# Patient Record
Sex: FEMALE | Race: WHITE | NOT HISPANIC OR LATINO | Employment: FULL TIME | URBAN - METROPOLITAN AREA
[De-identification: names, ages, dates, MRNs, and addresses within clinical notes are randomized per-mention and may not be internally consistent; named-entity substitution may affect disease eponyms.]

---

## 2019-11-04 ENCOUNTER — OFFICE VISIT - RIVER FALLS (OUTPATIENT)
Dept: FAMILY MEDICINE | Facility: CLINIC | Age: 24
End: 2019-11-04

## 2019-12-27 ENCOUNTER — OFFICE VISIT - RIVER FALLS (OUTPATIENT)
Dept: FAMILY MEDICINE | Facility: CLINIC | Age: 24
End: 2019-12-27

## 2019-12-27 ASSESSMENT — MIFFLIN-ST. JEOR: SCORE: 1465.46

## 2020-07-07 ENCOUNTER — OFFICE VISIT - RIVER FALLS (OUTPATIENT)
Dept: FAMILY MEDICINE | Facility: CLINIC | Age: 25
End: 2020-07-07

## 2020-12-08 ENCOUNTER — AMBULATORY - RIVER FALLS (OUTPATIENT)
Dept: FAMILY MEDICINE | Facility: CLINIC | Age: 25
End: 2020-12-08

## 2020-12-16 ENCOUNTER — OFFICE VISIT - RIVER FALLS (OUTPATIENT)
Dept: FAMILY MEDICINE | Facility: CLINIC | Age: 25
End: 2020-12-16

## 2020-12-16 ASSESSMENT — MIFFLIN-ST. JEOR: SCORE: 1446.53

## 2020-12-17 ENCOUNTER — AMBULATORY - RIVER FALLS (OUTPATIENT)
Dept: FAMILY MEDICINE | Facility: CLINIC | Age: 25
End: 2020-12-17

## 2020-12-18 LAB — C REACTIVE PROTEIN LHE: 22.1 MG/L

## 2020-12-21 ENCOUNTER — OFFICE VISIT - RIVER FALLS (OUTPATIENT)
Dept: FAMILY MEDICINE | Facility: CLINIC | Age: 25
End: 2020-12-21

## 2020-12-21 ASSESSMENT — MIFFLIN-ST. JEOR: SCORE: 1466.94

## 2021-01-06 ENCOUNTER — OFFICE VISIT - RIVER FALLS (OUTPATIENT)
Dept: FAMILY MEDICINE | Facility: CLINIC | Age: 26
End: 2021-01-06

## 2021-06-16 ENCOUNTER — OFFICE VISIT - RIVER FALLS (OUTPATIENT)
Dept: FAMILY MEDICINE | Facility: CLINIC | Age: 26
End: 2021-06-16

## 2021-07-05 ENCOUNTER — OFFICE VISIT - RIVER FALLS (OUTPATIENT)
Dept: FAMILY MEDICINE | Facility: CLINIC | Age: 26
End: 2021-07-05

## 2021-07-13 ENCOUNTER — OFFICE VISIT - RIVER FALLS (OUTPATIENT)
Dept: FAMILY MEDICINE | Facility: CLINIC | Age: 26
End: 2021-07-13

## 2021-07-13 ASSESSMENT — MIFFLIN-ST. JEOR: SCORE: 1465.58

## 2021-07-15 ENCOUNTER — COMMUNICATION - RIVER FALLS (OUTPATIENT)
Dept: FAMILY MEDICINE | Facility: CLINIC | Age: 26
End: 2021-07-15

## 2021-07-15 LAB
ALBUMIN UR-MCNC: NEGATIVE G/DL
APPEARANCE UR: CLEAR
BACTERIA SPEC CULT: NORMAL
BILIRUB UR QL STRIP: NEGATIVE
CHLAMYDIA TRACHOMATIS RNA, TMA - QUEST: NOT DETECTED
COLOR UR AUTO: NORMAL
GLUCOSE UR STRIP-MCNC: NEGATIVE MG/DL
HGB UR QL STRIP: NEGATIVE
KETONES UR STRIP-MCNC: NEGATIVE MG/DL
LEUKOCYTE ESTERASE UR QL STRIP: NEGATIVE
NEISSERIA GONORRHOEAE RNA TMA: NOT DETECTED
NITRATE UR QL: NEGATIVE
PH UR STRIP: 5.5 [PH]
SP GR UR STRIP: 1.01
TRICHOMONAS VAGINALIS (HISTORICAL): NOT DETECTED
UROBILINOGEN UR STRIP-MCNC: NORMAL MG/DL

## 2021-10-07 ENCOUNTER — OFFICE VISIT - RIVER FALLS (OUTPATIENT)
Dept: FAMILY MEDICINE | Facility: CLINIC | Age: 26
End: 2021-10-07

## 2021-11-12 ENCOUNTER — OFFICE VISIT - RIVER FALLS (OUTPATIENT)
Dept: FAMILY MEDICINE | Facility: CLINIC | Age: 26
End: 2021-11-12

## 2021-11-12 ASSESSMENT — MIFFLIN-ST. JEOR: SCORE: 1453.33

## 2021-11-18 ENCOUNTER — OFFICE VISIT - RIVER FALLS (OUTPATIENT)
Dept: FAMILY MEDICINE | Facility: CLINIC | Age: 26
End: 2021-11-18

## 2021-11-18 ASSESSMENT — MIFFLIN-ST. JEOR: SCORE: 1453.33

## 2021-11-24 ENCOUNTER — COMMUNICATION - RIVER FALLS (OUTPATIENT)
Dept: FAMILY MEDICINE | Facility: CLINIC | Age: 26
End: 2021-11-24

## 2021-11-24 ENCOUNTER — TRANSFERRED RECORDS (OUTPATIENT)
Dept: HEALTH INFORMATION MANAGEMENT | Facility: CLINIC | Age: 26
End: 2021-11-24

## 2021-11-24 LAB
C TRACH DNA SPEC QL PROBE+SIG AMP: NEGATIVE
N GONORRHOEA DNA SPEC QL PROBE+SIG AMP: NEGATIVE
SPECIMEN DESCRIP: NORMAL
SPECIMEN DESCRIPTION: NORMAL

## 2021-12-01 ENCOUNTER — OFFICE VISIT - RIVER FALLS (OUTPATIENT)
Dept: FAMILY MEDICINE | Facility: CLINIC | Age: 26
End: 2021-12-01

## 2021-12-01 ASSESSMENT — MIFFLIN-ST. JEOR: SCORE: 1453.33

## 2022-01-08 LAB
HCT VFR BLD AUTO: 34.7 %
HGB BLD-MCNC: 11.3 G/DL
PLATELET # BLD AUTO: 214 X10^3/UL
WBC # BLD AUTO: 3.54 X10^3/UL

## 2022-01-18 ENCOUNTER — COMMUNICATION - RIVER FALLS (OUTPATIENT)
Dept: FAMILY MEDICINE | Facility: CLINIC | Age: 27
End: 2022-01-18

## 2022-01-24 ENCOUNTER — OFFICE VISIT - RIVER FALLS (OUTPATIENT)
Dept: FAMILY MEDICINE | Facility: CLINIC | Age: 27
End: 2022-01-24

## 2022-02-11 VITALS
HEART RATE: 98 BPM | BODY MASS INDEX: 22.58 KG/M2 | TEMPERATURE: 97.2 F | HEART RATE: 72 BPM | HEIGHT: 68 IN | WEIGHT: 149 LBS | WEIGHT: 149 LBS | SYSTOLIC BLOOD PRESSURE: 109 MMHG | BODY MASS INDEX: 22.58 KG/M2 | TEMPERATURE: 97.7 F | DIASTOLIC BLOOD PRESSURE: 72 MMHG | DIASTOLIC BLOOD PRESSURE: 64 MMHG | BODY MASS INDEX: 22.58 KG/M2 | HEIGHT: 68 IN | SYSTOLIC BLOOD PRESSURE: 120 MMHG | OXYGEN SATURATION: 99 % | SYSTOLIC BLOOD PRESSURE: 116 MMHG | DIASTOLIC BLOOD PRESSURE: 76 MMHG | HEART RATE: 79 BPM | HEIGHT: 68 IN | WEIGHT: 149 LBS | TEMPERATURE: 98.3 F

## 2022-02-11 VITALS
BODY MASS INDEX: 22.33 KG/M2 | DIASTOLIC BLOOD PRESSURE: 68 MMHG | TEMPERATURE: 98 F | HEIGHT: 69 IN | HEART RATE: 76 BPM | SYSTOLIC BLOOD PRESSURE: 106 MMHG | WEIGHT: 150.8 LBS

## 2022-02-11 VITALS
SYSTOLIC BLOOD PRESSURE: 110 MMHG | HEART RATE: 74 BPM | DIASTOLIC BLOOD PRESSURE: 76 MMHG | SYSTOLIC BLOOD PRESSURE: 116 MMHG | WEIGHT: 152 LBS | SYSTOLIC BLOOD PRESSURE: 114 MMHG | HEART RATE: 88 BPM | TEMPERATURE: 97.4 F | DIASTOLIC BLOOD PRESSURE: 72 MMHG | HEIGHT: 68 IN | HEIGHT: 68 IN | HEART RATE: 72 BPM | WEIGHT: 147.5 LBS | DIASTOLIC BLOOD PRESSURE: 62 MMHG | BODY MASS INDEX: 22.35 KG/M2 | BODY MASS INDEX: 23.04 KG/M2

## 2022-02-11 VITALS
HEIGHT: 68 IN | TEMPERATURE: 97.8 F | DIASTOLIC BLOOD PRESSURE: 64 MMHG | SYSTOLIC BLOOD PRESSURE: 116 MMHG | OXYGEN SATURATION: 99 % | BODY MASS INDEX: 22.99 KG/M2 | HEART RATE: 77 BPM | WEIGHT: 151.7 LBS

## 2022-02-11 VITALS — HEIGHT: 68 IN

## 2022-02-11 VITALS
DIASTOLIC BLOOD PRESSURE: 64 MMHG | WEIGHT: 155.2 LBS | TEMPERATURE: 98.9 F | OXYGEN SATURATION: 98 % | SYSTOLIC BLOOD PRESSURE: 108 MMHG | BODY MASS INDEX: 23.25 KG/M2 | HEART RATE: 86 BPM

## 2022-02-16 NOTE — TELEPHONE ENCOUNTER
Entered by Leticia Mann CMA on January 12, 2021 7:51:50 AM CST  ---------------------  From: Leticia Mann CMA   To: [facility] #[id number]    Sent: 1/12/2021 7:51:50 AM CST  Subject: Medication Management     ** Not Approved: Refill not appropriate **  ethinyl estradiol-etonogestrel (ETONOGESTERE ETHYNYL EST VAG RING)  INSERT 1 RING VAGINALLY EVERY 4 WEEKS  Qty:  1 ring        Days Supply:  28        Refills:  0          Substitutions Allowed     Route To Pharmacy - [facility] #[id number]   Signed by Leticia Mann CMA            ------------------------------------------  From: [facility] #[id number]  To: Edmar Bermudez MD  Sent: January 12, 2021 3:58:44 AM CST  Subject: Medication Management  Due: January 12, 2021 10:11:26 AM CST     ** On Hold Pending Signature **     Dispensed Drug: ethinyl estradiol-etonogestrel (ethinyl estradiol-etonogestrel 0.015 mg-0.120 mg/24 hours vaginal ring), INSERT 1 RING VAGINALLY EVERY 4 WEEKS  Quantity: 1 ring  Days Supply: 28  Refills: 0  Substitutions Allowed  Notes from Pharmacy:  ------------------------------------------IUD placed 1/6/21

## 2022-02-16 NOTE — NURSING NOTE
CAGE Assessment Entered On:  12/28/2020 3:13 PM CST    Performed On:  12/16/2020 3:13 PM CST by Arlene Alexis               Assessment   Have you ever felt you should cut down on your drinking :   No   Have people annoyed you by criticizing your drinking :   No   Have you ever felt bad or guilty about your drinking :   No   Have you ever taken a drink first thing in the morning to steady your nerves or get rid of a hangover (Eye-opener) :   No   CAGE Score :   0    Arlene Alexis - 12/28/2020 3:13 PM CST

## 2022-02-16 NOTE — PROGRESS NOTES
Patient:   MADHU SPANGLER            MRN: [id number]            FIN: [id number]               Age:   25 years     Sex:  Female     :  1995   Associated Diagnoses:   Well adult exam   Author:   Edmar Bermudez MD      Visit Information      Date of Service: 2020 03:58 pm  Performing Location: [facility]  Encounter#: [id number]      Primary Care Provider (PCP):  Zac Correa MD    NPI# [id number]   Visit type:  Annual exam.    Accompanied by:  No one.    Source of history:  Self.       Chief Complaint   2020 4:11 PM CST   Px        Well Adult History   Patient is here for physical exam.  She currently lives in the [facility] with her sister.  She does have a partner of 3 years.  She had STD screening during this time.  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  She does not smoke limited alcohol use.  She is looking to change from her current NuvaRing which she does not like as she feels down 1 week of the month.  She sees [facility] for her Crohn s disease.  She has had a prior hemicolectomy.         Review of Systems   Constitutional:  No weakness, No fatigue, No decreased activity, No weight gain.    Eye:  No recent visual problem, No blurring, No double vision.    Ear/Nose/Mouth/Throat:  Negative.    Respiratory:  No shortness of breath, No cough.    Cardiovascular:  No chest pain, No peripheral edema.    Gastrointestinal:  No nausea, No vomiting, No diarrhea, No constipation, No abdominal pain.    Genitourinary:  No dysuria, No hematuria, No change in urine stream.    Gynecologic:  Negative.    Hematology/Lymphatics:  Negative.    Endocrine:  Negative.    Immunologic:  Negative.    Musculoskeletal:  Negative.    Integumentary:  No rash.    Neurologic:  Alert and oriented X4.    Psychiatric:  Negative.       Health Status   Allergies:    Nonallergic Reactions (Selected)  Severity Not  Documented  Asacol (Diarrhea)   Problem list:    All Problems  Crohn disease / SNOMED CT 59902324 / Confirmed  Duodenitis / ICD-9-.60 / Confirmed  Resolved: Otitis Media / ICD-9-.9  Canceled: Crohns Disease / ICD-9-.9   Medications:  (Selected)   Prescriptions  Prescribed  NuvaRing 0.120 mg-0.015 mg/24 hours vaginal rin EA, Vaginal, q4 wks, Instructions: 3 month supply sent. Need annual px for further refills., # 1 EA, 3 Refill(s), Type: Maintenance, Pharmacy: [facility] #[id number], 1 EA Vaginal q4 wks,Instr:3 month supply sent. Need annual px for further ref...  Documented Medications  Documented  Humira 40 mg/0.8 mL subcutaneous kit: 0.8 mL ( 40 mg ), subcutaneous, once, Instructions: per Dr. Adry Vaca, 0 Refill(s), Type: Maintenance      Histories   Past Medical History:    Active  Crohn disease (12868168)  Resolved  Otitis Media (382.9): Onset in  at 13 years.  Resolved.   Family History:    Heart  Sister  Hypothyroid  Mother  Diabetes mellitus  Sister  Kidney Disease  Father     Procedure history:    Esophagogastroduodenoscopy (SNOMED CT 801677416) performed by Ronald Ga MD on 2011 at 16 Years.  Colonoscopy (SNOMED CT 848158346) on 2011 at 16 Years.  None (SNOMED CT 869339489).  History of hemicolectomy (SNOMED CT 1717360557).   Social History:        Electronic Cigarette/Vaping Assessment            Electronic Cigarette Use: Never.      Alcohol Assessment: Current            Current, 1-2 times per week      Tobacco Assessment            Never (less than 100 in lifetime)      Substance Abuse Assessment: Denies Substance Abuse      Employment and Education Assessment            Student                     Comments:                      2011 - [employer]      Exercise and Physical Activity Assessment: Does not exercise      Sexual Assessment: No Sexual Activity  ,        Electronic Cigarette/Vaping Assessment             Electronic Cigarette Use: Never.      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 FONTANEZ, [employer]      Exercise and Physical Activity Assessment: Does not exercise      Sexual Assessment: No Sexual Activity        Physical Examination   Vital Signs   12/16/2020 4:11 PM CST Temperature Tympanic 97.4 DegF  LOW    Peripheral Pulse Rate 88 bpm    HR Method Electronic    Systolic Blood Pressure 114 mmHg    Diastolic Blood Pressure 76 mmHg    Mean Arterial Pressure 89 mmHg    BP Method Electronic      Measurements from flowsheet : Measurements   12/16/2020 4:11 PM CST Height Measured - Standard 68.25 in    Weight Measured - Standard 147.5 lb    BSA 1.79 m2    Body Mass Index 22.26 kg/m2      General:  Alert and oriented, No acute distress.    Eye:  Pupils are equal, round and reactive to light, Extraocular movements are intact, Normal conjunctiva, Vision unchanged.    HENT:  Normocephalic, Tympanic membranes are clear.    Neck:  Supple, Non-tender, No carotid bruit, No jugular venous distention, No lymphadenopathy, No thyromegaly.    Respiratory:  Lungs are clear to auscultation, Respirations are non-labored, Breath sounds are equal, Symmetrical chest wall expansion.    Cardiovascular:  Normal rate, Regular rhythm, No murmur, Good pulses equal in all extremities, Normal peripheral perfusion, No edema.    Gastrointestinal:  Soft, Non-tender, Non-distended, Normal bowel sounds, No organomegaly.    Musculoskeletal:  Normal range of motion, Normal strength, No tenderness, No deformity, Normal gait.    Integumentary:  Warm, Dry, No rash.    Neurologic:  Alert, Oriented, No focal deficits, Cranial Nerves II-XII are grossly  intact.    Psychiatric:  Cooperative, Appropriate mood & affect.       Impression and Plan   Diagnosis     Well adult exam (JXV43-BE Z00.00).     Course:  Progressing as expected.    Plan:  fu 1 yr, She will set up a time here with nurse practitioner for Pap smear and then information given on both the Nexplanon and IUD.  .    Patient Instructions:       Counseled: Patient, Regarding diagnosis, Regarding medications.

## 2022-02-16 NOTE — NURSING NOTE
Comprehensive Intake Entered On:  6/16/2021 7:45 AM CDT    Performed On:  6/16/2021 7:41 AM CDT by Fletcher Stovall LPN               Summary   Chief Complaint :   VIDEO VISIT, NO VITAL SIGNS TAKEN - UTI CONCERNS - Symptoms started yesterday, Increased frequency, burning, uncomfortable, drinking water but not improving, hx of UTI   Menstrual Status :   Menarcheal   Fletcher Stovall LPN - 6/16/2021 7:41 AM CDT   Consents   Consent for Immunization Exchange :   Consent Granted   Consent for Immunizations to Providers :   Consent Granted   Fletcher Stovall LPN - 6/16/2021 7:41 AM CDT   Meds / Allergies   (As Of: 6/16/2021 7:45:27 AM CDT)   Allergies (Active)   Asacol  Estimated Onset Date:   Unspecified ; Reactions:   Diarrhea ; Created By:   Heidi Bowles LPN; Reaction Status:   Active ; Category:   Drug ; Substance:   Asacol ; Type:   Side Effect ; Updated By:   Heidi Bowles LPN; Source:   Patient ; Reviewed Date:   7/7/2020 1:08 PM CDT        Medication List   (As Of: 6/16/2021 7:45:27 AM CDT)   Prescription/Discharge Order    levonorgestrel  :   levonorgestrel ; Status:   Prescribed ; Ordered As Mnemonic:   Kyleena 19.5 mg intrauterine device ; Simple Display Line:   19.5 mg, 1 EA, Intrauteral, once, 1 EA, 0 Refill(s) ; Ordering Provider:   Cecil Benton MD; Catalog Code:   levonorgestrel ; Order Dt/Tm:   1/6/2021 10:37:27 AM CST ; Comment:   Placed 1/6/21  Lot [id number]  Exp 2/2022            Home Meds    adalimumab  :   adalimumab ; Status:   Documented ; Ordered As Mnemonic:   Humira 40 mg/0.8 mL subcutaneous kit ; Simple Display Line:   40 mg, 0.8 mL, subcutaneous, once, per Dr. Adry Vaca ; Catalog Code:   adalimumab ; Order Dt/Tm:   12/21/2012 4:10:05 PM CST            ID Risk Screen   Recent Travel History :   No recent travel   Family Member Travel History :   No recent travel   Other Exposure to Infectious Disease :   Unknown   COVID-19 Testing Status :   No positive COVID-19 test   Sia  Fletcher JIANG - 6/16/2021 7:41 AM CDT   Social History   Social History   (As Of: 6/16/2021 7:45:27 AM CDT)   Alcohol:        Current, Beer (12 oz), Wine (5 oz), 1-2 times per week, 1 drinks/episode average.  2 drinks/episode maximum.  Ready to change: No.   (Last Updated: 12/28/2020 3:10:49 PM CST by Arlene Alexis)          Tobacco:        Never (less than 100 in lifetime)   (Last Updated: 12/16/2020 4:11:32 PM CST by Lexi Estrada)          Electronic Cigarette/Vaping:        Electronic Cigarette Use: Never.   (Last Updated: 12/16/2020 4:11:35 PM CST by Lexi Estrada)          Substance Abuse:        Current, Marijuana, 1-2 times per year, Ready to change: No.   (Last Updated: 12/28/2020 3:11:06 PM CST by Arlene Alexis)          Home/Environment:        Marital status: Single.  Living situation: Home/Independent.  Injuries/Abuse/Neglect in household: No.  Feels unsafe at home: No.  Family/Friends available for support: Yes.   (Last Updated: 12/28/2020 3:11:33 PM CST by Arlene Alexis)          Nutrition/Health:        Type of diet: Regular.  Wants to lose weight: No.  Sleeping concerns: No.  Feels highly stressed: No.   (Last Updated: 12/28/2020 3:11:44 PM CST by Arlene Alexis)          Exercise:        Exercise frequency: 1-2 times/week.  Exercise type: Yoga.   (Last Updated: 12/28/2020 3:11:59 PM CST by Arlene Alexis)          Sexual:        Sexually active: Yes.  Identifies as female, Sexual orientation: Straight or heterosexual.  History of STD: No.  Contraceptive Use Details: Vaginal ring.  History of sexual abuse: No.   (Last Updated: 12/28/2020 3:12:17 PM CST by Arlene Alexis)

## 2022-02-16 NOTE — PROGRESS NOTES
Patient:   MADHU SPANGLER            MRN: [id number]            FIN: [id number]               Age:   25 years     Sex:  Female     :  1995   Associated Diagnoses:   Acute cystitis   Author:   Parviz Yeh MD      Visit Information      Date of Service: 2020 10:20 am  Performing Location: [facility]  Encounter#: [id number]      Primary Care Provider (PCP):  Zac Correa MD    NPI# [id number]      Referring Provider:  No referring provider recorded for selected visit.      Chief Complaint   2020 10:13 AM CDT    Verbal consent given for video visit via cell phone. Concerns with polyuria and dysuria x 3 days. Has UTI hx.        Subjective   Chief complaint 2020 10:13 AM CDT    Verbal consent given for video visit via cell phone. Concerns with polyuria and dysuria x 3 days. Has UTI hx.  .     see chief complaint as noted above and confirmed with the patient  no nausea, no emesis, no fever or chills, no flank pain  video visit with patient at home      Health Status   Allergies:    Nonallergic Reactions (Selected)  Severity Not Documented  Asacol (Diarrhea)   Medications:  (Selected)   Prescriptions  Prescribed  NuvaRing 0.120 mg-0.015 mg/24 hours vaginal rin EA, Vaginal, q4 wks, Instructions: 3 month supply sent. Need annual px for further refills., # 1 EA, 3 Refill(s), Type: Maintenance, Pharmacy: [facility] #[id number], 1 EA Vaginal q4 wks,Instr:3 month supply sent. Need annual px for further ref...  nitrofurantoin macrocrystals 50 mg oral capsule: = 1 cap(s) ( 50 mg ), Oral, qid, x 3 day(s), # 12 cap(s), 0 Refill(s), Type: Acute, Pharmacy: [facility] #[id number], 1 cap(s) Oral qid,x3 day(s), 68.5, in, 19 15:33:00 CST, Height Measured, 150.8, lb, 19 15:33:00 CST, Weight Measured...  Documented Medications  Documented  Humira 40 mg/0.8 mL subcutaneous kit: 0.8 mL ( 40 mg ), subcutaneous, once, Instructions: per Dr. Adry Vaca, 0 Refill(s),  Type: Maintenance,    Medications          *denotes recorded medication          *Humira 40 mg/0.8 mL subcutaneous kit: 40 mg, 0.8 mL, subcutaneous, once, per Dr. Adry Vaca.          NuvaRing 0.120 mg-0.015 mg/24 hours vaginal rin EA, Vaginal, q4 wks, 3 month supply sent. Need annual px for further refills., 1 EA, 3 Refill(s).          nitrofurantoin macrocrystals 50 mg oral capsule: 50 mg, 1 cap(s), Oral, qid, for 3 day(s), 12 cap(s), 0 Refill(s).       Problem list:    All Problems (Selected)  Duodenitis / 535.60 / Confirmed  Crohn disease / 91646396 / Confirmed      Objective   General:  Alert and oriented, No acute distress.    Psychiatric:  Cooperative, Appropriate mood & affect, Normal judgment.       Impression and Plan   Assessment and Plan:          Diagnosis: Acute cystitis (INZ32-TZ N30.00).         Course: discussed UTI and what to expect and when to return especially fevers, flank pain and nausea vomiting  reviewed fluid intake  discussed antibiotics and risks including yeast infections.    Orders      (Selected)   Prescriptions  Prescribed  nitrofurantoin macrocrystals 50 mg oral capsule: = 1 cap(s) ( 50 mg ), Oral, qid, x 3 day(s), # 12 cap(s), 0 Refill(s), Type: Acute, Pharmacy: [facility] #[id number], 1 cap(s) Oral qid,x3 day(s), 68.5, in, 19 15:33:00 CST, Height Measured, 150.8, lb, 19 15:33:00 CST, Weight Measured....     .

## 2022-02-16 NOTE — NURSING NOTE
Comprehensive Intake Entered On:  7/5/2021 11:26 AM CDT    Performed On:  7/5/2021 11:23 AM CDT by Elda Padron LPN               Summary   Chief Complaint :   fells that UTI is back, Urine frequency started again this morning.  wondering if could get a refill of antibioitcs or change them. Verbal consent for Video visit.    Menstrual Status :   Menarcheal   Elda Padron LPN - 7/5/2021 11:23 AM CDT   Health Status   Allergies Verified? :   Yes   Medication History Verified? :   Yes   Pre-Visit Planning Status :   Completed   Elda Padron LPN - 7/5/2021 11:23 AM CDT   Consents   Consent for Immunization Exchange :   Consent Granted   Consent for Immunizations to Providers :   Consent Granted   Elda Padron LPN - 7/5/2021 11:23 AM CDT   Meds / Allergies   (As Of: 7/5/2021 11:26:20 AM CDT)   Allergies (Active)   Asacol  Estimated Onset Date:   Unspecified ; Reactions:   Diarrhea ; Created By:   Heidi Bowles LPN; Reaction Status:   Active ; Category:   Drug ; Substance:   Asacol ; Type:   Side Effect ; Updated By:   Heidi Bowles LPN; Source:   Patient ; Reviewed Date:   7/5/2021 11:25 AM CDT        Medication List   (As Of: 7/5/2021 11:26:20 AM CDT)   Prescription/Discharge Order    levonorgestrel  :   levonorgestrel ; Status:   Prescribed ; Ordered As Mnemonic:   Kyleena 19.5 mg intrauterine device ; Simple Display Line:   19.5 mg, 1 EA, Intrauteral, once, 1 EA, 0 Refill(s) ; Ordering Provider:   Cecil Benton MD; Catalog Code:   levonorgestrel ; Order Dt/Tm:   1/6/2021 10:37:27 AM CST ; Comment:   Placed 1/6/21  Lot [id number]  Exp 2/2022          sulfamethoxazole-trimethoprim  :   sulfamethoxazole-trimethoprim ; Status:   Processing ; Ordered As Mnemonic:   sulfamethoxazole-trimethoprim 800 mg-160 mg oral tablet ; Ordering Provider:   Parviz Yeh MD; Action Display:   Complete ; Catalog Code:   sulfamethoxazole-trimethoprim ; Order Dt/Tm:   7/5/2021 11:23:59 AM CDT             Home Meds    adalimumab  :   adalimumab ; Status:   Documented ; Ordered As Mnemonic:   Humira 40 mg/0.8 mL subcutaneous kit ; Simple Display Line:   40 mg, 0.8 mL, subcutaneous, once, per Dr. Adry Vaca ; Catalog Code:   adalimumab ; Order Dt/Tm:   12/21/2012 4:10:05 PM CST

## 2022-02-16 NOTE — NURSING NOTE
Comprehensive Intake Entered On:  12/27/2019 3:38 PM CST    Performed On:  12/27/2019 3:33 PM CST by Maria Teresa Pool CMA               Summary   Chief Complaint :   c/o left ear pain/jaw for the past month, dizziness for the past week   Menstrual Status :   Menarcheal   Weight Measured :   150.8 lb(Converted to: 150 lb 13 oz, 68.40 kg)    Height Measured :   68.5 in(Converted to: 5 ft 8 in, 173.99 cm)    Body Mass Index :   22.59 kg/m2   Body Surface Area :   1.82 m2   Systolic Blood Pressure :   106 mmHg   Diastolic Blood Pressure :   68 mmHg   Mean Arterial Pressure :   81 mmHg   Peripheral Pulse Rate :   76 bpm   BP Site :   Right arm   Pulse Site :   Radial artery   BP Method :   Manual   HR Method :   Manual   Temperature Tympanic :   98 DegF(Converted to: 36.7 DegC)    Maria Teresa Pool CMA - 12/27/2019 3:33 PM CST   Health Status   Allergies Verified? :   Yes   Medication History Verified? :   Yes   Medical History Verified? :   No   Pre-Visit Planning Status :   Not completed   Tobacco Use? :   Never smoker   Maria Teresa Pool CMA - 12/27/2019 3:33 PM CST   Consents   Consent for Immunization Exchange :   Consent Granted   Consent for Immunizations to Providers :   Consent Granted   Maria Teresa Pool CMA - 12/27/2019 3:33 PM CST   Problems   (As Of: 12/27/2019 3:38:39 PM CST)   Problems(Active)    Crohn disease (SNOMED CT  :62325374 )  Name of Problem:   Crohn disease ; Recorder:   Michelle Barron; Confirmation:   Confirmed ; Classification:   Medical ; Code:   43875418 ; Contributor System:   PowerChart ; Last Updated:   1/22/2016 11:50 AM CST ; Life Cycle Status:   Active ; Vocabulary:   SNOMED CT        Duodenitis (ICD-9-CM  :535.60 )  Name of Problem:   Duodenitis ; Recorder:   Destinee Zapata CMA; Confirmation:   Confirmed ; Classification:   Medical ; Code:   535.60 ; Contributor System:   PowerChart ; Last Updated:   7/22/2012 11:17 AM CDT ; Life Cycle Date:   7/22/2012 ; Life Cycle Status:   Active ; Vocabulary:    ICD-9-CM          Meds / Allergies   (As Of: 12/27/2019 3:38:39 PM CST)   Allergies (Active)   Asacol  Estimated Onset Date:   Unspecified ; Reactions:   Diarrhea ; Created By:   Heidi Bowles LPN; Reaction Status:   Active ; Category:   Drug ; Substance:   Asacol ; Type:   Side Effect ; Updated By:   Heidi Bowles LPN; Source:   Patient ; Reviewed Date:   12/27/2019 3:36 PM CST        Medication List   (As Of: 12/27/2019 3:38:39 PM CST)   Home Meds    adalimumab  :   adalimumab ; Status:   Documented ; Ordered As Mnemonic:   Humira 40 mg/0.8 mL subcutaneous kit ; Simple Display Line:   40 mg, 0.8 mL, subcutaneous, once, per Dr. Adry Vaca ; Catalog Code:   adalimumab ; Order Dt/Tm:   12/21/2012 4:10:05 PM CST          azaTHIOprine  :   azaTHIOprine ; Status:   Documented ; Ordered As Mnemonic:   azaTHIOprine 50 mg oral tablet ; Simple Display Line:   50 mg, 1 tab(s), Oral, daily, 0 Refill(s) ; Catalog Code:   azaTHIOprine ; Order Dt/Tm:   11/4/2019 5:40:50 PM CST          ethinyl estradiol-etonogestrel  :   ethinyl estradiol-etonogestrel ; Status:   Documented ; Ordered As Mnemonic:   NuvaRing ; Simple Display Line:   1 EA, vag, q4 wks, 0 Refill(s) ; Catalog Code:   ethinyl estradiol-etonogestrel ; Order Dt/Tm:   11/24/2015 4:08:39 PM CST

## 2022-02-16 NOTE — PROGRESS NOTES
Chief Complaint    VIDEO VISIT, NO VITAL SIGNS TAKEN - UTI CONCERNS - Symptoms started yesterday, Increased frequency, burning, uncomfortable, drinking water but not improving, hx of UTI  History of Present Illness       Patient requesting treatment for UTI.  24 hours of urinary frequency burning.  She has had UTIs before she also has Crohn's disease.  She is not having fever chills or flank pain no nausea vomiting       Video visit from summer school in [facility] from 7:40 AM to 7:55 AM  patient consented to video visit  Review of Systems       See HPI.  All other review of systems negative.  Physical Exam       Alert talkative no distress  Assessment/Plan       1. UTI symptoms (R39.9)          Discussed signs to watch for when to return we will go ahead and treat with Septra DS twice a day for 5 days  Patient Information     Name:MADHU SPANGLER      Address:      [address] [place] [id number]     Sex:Female     YOB: 1995     [phone]     Emergency Contact:SUSANNE SPANGLER     MRN:[id number]     FIN:[id number]     Location:[facility]     Date of Service:06/16/2021      Primary Care Physician:       Zac Correa MD, [phone]      Attending Physician:       Parviz Yeh MD, [phone]  Problem List/Past Medical History    Ongoing     ASCUS with positive high risk HPV cervical       Comments: referred for colposcopy     Crohn disease     Duodenitis    Historical     Otitis Media  Procedure/Surgical History     Insertion of IUD (01/06/2021)      Comments: Kyleena IUD      Lot [id number]      Exp 2/2022.     Colonoscopy (12/05/2011)     Esophagogastroduodenoscopy (12/05/2011)     History of hemicolectomy     None  Medications    Humira 40 mg/0.8 mL subcutaneous kit, 40 mg= 0.8 mL, Subcutaneous, once    Kyleena 19.5 mg intrauterine device, 19.5 mg= 1 EA, Intrauteral, once    sulfamethoxazole-trimethoprim 800 mg-160 mg oral tablet, 1 tab(s), Oral,  bid  Allergies    Asacol (Diarrhea)  Social History    Smoking Status     Never smoker     Alcohol      Current, Beer (12 oz), Wine (5 oz), 1-2 times per week, 1 drinks/episode average. 2 drinks/episode maximum. Ready to change: No.     Electronic Cigarette/Vaping      Electronic Cigarette Use: Never.     Exercise      Exercise frequency: 1-2 times/week. Exercise type: Yoga.     Home/Environment      Marital status: Single. Living situation: Home/Independent. Injuries/Abuse/Neglect in household: No. Feels unsafe at home: No. Family/Friends available for support: Yes.     Nutrition/Health      Type of diet: Regular. Wants to lose weight: No. Sleeping concerns: No. Feels highly stressed: No.     Sexual      Sexually active: Yes. Identifies as female, Sexual orientation: Straight or heterosexual. History of STD: No. Contraceptive Use Details: Vaginal ring. History of sexual abuse: No.     Substance Abuse      Current, Marijuana, 1-2 times per year, Ready to change: No.     Tobacco      Never (less than 100 in lifetime)  Family History    Alzheimer's disease: Grandmother (P).    Asthma: Mother.    Diabetes mellitus: Sister.    Heart: Sister.    Hypothyroid: Mother.    Kidney Disease: Father.    Mental health disorder: Father.  Immunizations          Scheduled Immunizations          Dose Date(s)          human papillomavirus vaccine          06/12/2012, 08/31/2012, 12/21/2012          influenza virus vaccine, inactivated          10/04/2013, 10/24/2008, 09/28/2009, 10/01/2013, 10/10/2019          meningococcal conjugate vaccine          08/31/2012          tetanus/diphth/pertuss (Tdap) adult/adol          01/01/2011          varicella          09/29/2010          Other Immunizations          Hep B          01/23/1998, 02/24/1998, 08/12/1998          influenza          09/29/2010          MMR (measles/mumps/rubella)          04/18/1994, 08/12/1998          varicella          08/12/1998          OPV          1995,  1993, 04/18/1994, 08/12/1998          influenza virus vaccine, inactivated          10/05/2012, 12/23/2015, 12/16/2020          Hib (PRP-T)          1995, 1993, 1993, 04/18/1994          meningococcal conjugate vaccine          11/12/2005          tetanus/diphth/pertuss (Tdap) adult/adol          11/12/2005, 12/16/2020          DTaP          1995, 1995, 1993, 04/18/1994, 08/12/1998

## 2022-02-16 NOTE — TELEPHONE ENCOUNTER
---------------------  From: Eloise Wyatt RN (Phone Messages Pool ([id number]_[facility]))   To: BRM Message Pool ([id number]_[place] [facility] [place]);     Sent: 10/7/2021 10:00:04 AM CDT  Subject: Monoclonal Antibodies        PCP:   Asked for BRM      Time of Call:  0955       Person Calling:  Pt  Phone number:  [phone]    Note:   Pt calling stating that her dad used to work at [employer] and he told her to call and ask for BRM.  Pt states that she was tested at  [facility] [place], [place] and is positive for COVID.  Pt states she has Crohn's disease and is treated with Humira.  Pt states that she has a low grade fever, chills, dry cough, congestion, chest pressure and some SOB.  Pt does not have a way to check her O2 sats at home.  Pt is requesting monoclonal antibody treatment.  Please advise.    Last office visit and reason:  7/13/21 Possible UTI---------------------  From: Destinee Zapata CMA (BRM Message Pool ([id number]_[facility]))   To: Roddy Johnson MD;     Sent: 10/7/2021 10:21:48 AM CDT  Subject: FW: Monoclonal Antibodies      please advise  pt's sx started Tuesday---------------------  From: Roddy Johnson MD   To: [facility] Message Pool ([id number]_[place]);     Sent: 10/7/2021 11:08:51 AM CDT  Subject: RE: Monoclonal Antibodies      I spoke with Brenda.  Can you get her on our schedule for telephone visit.  I've already talked with her, but I think best to have an official telephone visit if we're pursuing monoclonal antibodies.    I completed [facility] form.  I'm not sure if her residence is going to be an issue (living in [facility], but working in [place]).  I would encourage her to use the [facility] as place of address.monoclonal antibody infusion order faxed to [facility]-confirmation rec'd

## 2022-02-16 NOTE — PROGRESS NOTES
Patient:   MADHU SPANGLER            MRN: [id number]            FIN: [id number]               Age:   25 years     Sex:  Female     :  1995   Associated Diagnoses:   Pap smear for cervical cancer screening; Screen for STD (sexually transmitted disease)   Author:   Nidia Lozada      Chief Complaint   2020 5:35 PM CST   due pap, would like to discuss birth control        History of Present Illness   hasn't had pap for about 5  years, last was normal  monthly periods with nuvaring, she would like to switch to an IUD maybe, looking for more information  has no contraindications to use  Is with steady partner currently, agreeable to ct screen with pap today         Health Status   Allergies:    Nonallergic Reactions (Selected)  Severity Not Documented  Asacol (Diarrhea)   Medications:  (Selected)   Prescriptions  Prescribed  NuvaRing 0.120 mg-0.015 mg/24 hours vaginal rin EA, Vaginal, q4 wks, # 1 EA, 0 Refill(s), Type: Maintenance, Pharmacy: [id number], 1 EA Vaginal q4 wks, 68.25, in, 20 16:11:00 CST, Height Measured, 147.5, lb, 20 16:11:00 CST, Weight Measured  Documented Medications  Documented  Humira 40 mg/0.8 mL subcutaneous kit: 0.8 mL ( 40 mg ), subcutaneous, once, Instructions: per Dr. Adry Vaca, 0 Refill(s), Type: Maintenance   Problem list:    All Problems  Crohn disease / SNOMED CT 62348206 / Confirmed  Duodenitis / ICD-9-.60 / Confirmed  Resolved: Otitis Media / ICD-9-.9  Canceled: Crohns Disease / ICD-9-.9      Histories   Past Medical History:    Active  Crohn disease (48032612)  Resolved  Otitis Media (382.9): Onset in  at 13 years.  Resolved.   Family History:    Heart  Sister  Hypothyroid  Mother  Diabetes mellitus  Sister  Kidney Disease  Father     Procedure history:    Esophagogastroduodenoscopy (SNOMED CT 800413473) performed by Ronald Ga MD on 2011 at 16 Years.  Colonoscopy (SNOMED CT 924046074) on 2011 at  16 Years.  None (SNOMED CT 337440553).  History of hemicolectomy (SNOMED CT 1199467032).   Social History:        Electronic Cigarette/Vaping Assessment            Electronic Cigarette Use: Never.      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    Genitourinary:  No inguinal tenderness, No urethral discharge, No lesions, pelvic exam reveals no inguinal lymph nodes palpable, no external lesions, no odor no discharge. Introitus normal with vault with normal rugae, cervix visualized and clear. No cervical motion tenderness, no adnexal tenderness or masses  .       Impression and Plan   Diagnosis     Pap smear for cervical cancer screening (LOJ50-OE Z12.4).     Screen for STD (sexually transmitted disease) (GGB33-TI Z11.3).     Patient Instructions:       Counseled: Patient, Regarding diagnosis, Regarding treatment, Regarding medications, Verbalized understanding.    Orders     Orders (Selected)   Outpatient Orders  Ordered (Dispatched)  Thinprep tis pap reflex hpv mRNA Chlamydia/N.Gonorrhoeae* ([facility]): Specimen Type: Pap, Collection Date: 12/21/20 17:59:00 CST.     educated on  use/risk/benefit insertion of IUD, she would like to proceed with kyleena or eleazar, looking for something with a little less hormone than currently. She will continue nuvaring till IUD is inserted, she has supply.

## 2022-02-16 NOTE — PROGRESS NOTES
Patient:   MADHU SPANGLER            MRN: [id number]            FIN: [id number]               Age:   26 years     Sex:  Female     :  1995   Associated Diagnoses:   Dysuria; Routine screening for STI (sexually transmitted infection)   Author:   Nidia Lozada      Visit Information      Date of Service: 2021 12:46 pm  Performing Location: [facility]  Encounter#: [id number]      Primary Care Provider (PCP):  Zac Correa MD    NPI# [id number]      Referring Provider:  Nidia Lozada    NPI# [id number]      Chief Complaint   2021 12:51 PM CDT   possible UTI, dx about 1 month ago, has taken 2 rounds of antibiotic and feels like something is still off      History of Present Illness   dysuria and frequency of urine for almost a month. No vaginal symptoms. Has taken two rounds of antibiotics (bactrim twice) but hasn't had UA/UC yet, will do that today. No fever or back pain. Uses Humira for Chrons.  Has a new sexual partner, using Kyleena IUD with no periods, very pleased with IUD. She is using a med for urinary pain and cranberry tabs but over all she feels better      Health Status   Allergies:    Nonallergic Reactions (Selected)  Severity Not Documented  Asacol (Diarrhea)   Medications:  (Selected)   Prescriptions  Prescribed  Kyleena 19.5 mg intrauterine device: = 1 EA ( 19.5 mg ), Intrauteral, once, # 1 EA, 0 Refill(s), Type: Soft Stop, other reason (Rx)  Documented Medications  Documented  Humira 40 mg/0.8 mL subcutaneous kit: 0.8 mL ( 40 mg ), subcutaneous, once, Instructions: per Dr. Adry Vaca, 0 Refill(s), Type: Maintenance,    Medications          *denotes recorded medication          *Humira 40 mg/0.8 mL subcutaneous kit: 40 mg, 0.8 mL, subcutaneous, once, per Dr. Adry Vaca.          Kyleena 19.5 mg intrauterine device: 19.5 mg, 1 EA, Intrauteral, once, 1 EA, 0 Refill(s).       Problem list:    All Problems  ASCUS with positive high risk HPV cervical /  SNOMED CT 940648640 / Confirmed  referred for colposcopy  Crohn disease / SNOMED CT 13712773 / Confirmed  Duodenitis / ICD-9-.60 / Confirmed  Resolved: Otitis Media / ICD-9-.9  Canceled: Crohns Disease / ICD-9-.9      Histories   Past Medical History:    Active  Crohn disease (10988430)  Resolved  Otitis Media (382.9): Onset in 2009 at 13 years.  Resolved.   Family History:    Heart  Sister  Hypothyroid  Mother  Diabetes mellitus  Sister  Asthma  Mother  Mental health disorder  Father  Alzheimer's disease  Grandmother (P)  Kidney Disease  Father     Procedure history:    Insertion of IUD (SNOMED CT 500470098) performed by Cecil Benton MD on 1/6/2021 at 25 Years.  Comments:  1/6/2021 10:39 AM Viola Ybarra CMA IUD  Lot [id number]  Exp 2/2022  Esophagogastroduodenoscopy (SNOMED CT 347064033) performed by Ronald Ga MD on 12/5/2011 at 16 Years.  Colonoscopy (SNOMED CT 515394185) on 12/5/2011 at 16 Years.  None (SNOMED CT 302682907).  History of hemicolectomy (SNOMED CT 3847837305).   Social History:        Electronic Cigarette/Vaping Assessment            Electronic Cigarette Use: Never.      Alcohol Assessment            Current, Beer (12 oz), Wine (5 oz), 1-2 times per week, 1 drinks/episode average.  2 drinks/episode maximum.               Ready to change: No.      Tobacco Assessment            Never (less than 100 in lifetime)      Substance Abuse Assessment            Current, Marijuana, 1-2 times per year, Ready to change: No.      Home and Environment Assessment            Marital status: Single.  Living situation: Home/Independent.  Injuries/Abuse/Neglect in household: No.  Feels               unsafe at home: No.  Family/Friends available for support: Yes.      Nutrition and Health Assessment            Type of diet: Regular.  Wants to lose weight: No.  Sleeping concerns: No.  Feels highly stressed: No.      Exercise and Physical Activity Assessment            Exercise  frequency: 1-2 times/week.  Exercise type: Yoga.      Sexual Assessment            Sexually active: Yes.  Identifies as female, Sexual orientation: Straight or heterosexual.  History of STD:               No.  Contraceptive Use Details: Vaginal ring.  History of sexual abuse: No.        Physical Examination   VS/Measurements      Review / Management   Results review:  normal UA.       Impression and Plan   Diagnosis     Dysuria (JJL69-TD R30.0).     Routine screening for STI (sexually transmitted infection) (PZP92-TT Z11.3).     Patient Instructions:       Counseled: Patient, Regarding diagnosis, Regarding treatment, Regarding medications, Verbalized understanding, Counseled on symptomatic management. Return to clinic for re evaluation if worsening, simply not improving, or failure to resolve.   .    Orders     Orders (Selected)   Outpatient Orders  Ordered (In Transit)  Chlamydia/Neisseria gonorrhoeae RNA, TMA* ([facility]): Specimen Type: Urine, Collection Date: 07/13/21 13:14:00 CDT  Culture, Urine, Routine* ([facility]): Specimen Type: Urine (Clean Catch), Collection Date: 07/13/21 13:14:00 CDT  S[place]wab(R) Trichomonas vaginalis RNA, Ql, TMA* ([facility]): Specimen Type: Swab, Collection Date: 07/13/21 13:14:00 CDT.

## 2022-02-16 NOTE — NURSING NOTE
Comprehensive Intake Entered On:  12/1/2021 8:08 AM CST    Performed On:  12/1/2021 8:01 AM CST by Maria Del Rosario Huggins               Summary   Chief Complaint :   colposcopy per [facility], last pap 11/18/21 ASCUS HPV(+), had same thing on pap last year and had colpo on 1/6/21 which was normal.    Menstrual Status :   Menarcheal   Weight Measured :   149 lb(Converted to: 149 lb 0 oz, 67.585 kg)    Height Measured :   68.25 in(Converted to: 5 ft 8 in, 173.35 cm)    Body Mass Index :   22.49 kg/m2   Body Surface Area :   1.8 m2   Systolic Blood Pressure :   109 mmHg   Diastolic Blood Pressure :   72 mmHg   Mean Arterial Pressure :   84 mmHg   Peripheral Pulse Rate :   72 bpm   BP Site :   Right arm   Pulse Site :   Radial artery   BP Method :   Manual   HR Method :   Manual   Temperature Tympanic :   97.2 DegF(Converted to: 36.2 DegC)  (LOW)    Maria Del Rosario Huggins - 12/1/2021 8:01 AM CST   Health Status   Allergies Verified? :   Yes   Medication History Verified? :   Yes   Medical History Verified? :   Yes   Pre-Visit Planning Status :   Completed   Tobacco Use? :   Never smoker   Maria Del Rosario Huggins - 12/1/2021 8:01 AM CST   Consents   Consent for Immunization Exchange :   Consent Granted   Consent for Immunizations to Providers :   Consent Granted   Maria Del Rosario Huggins - 12/1/2021 8:01 AM CST   Meds / Allergies   (As Of: 12/1/2021 8:08:43 AM CST)   Allergies (Active)   Asacol  Estimated Onset Date:   Unspecified ; Reactions:   Diarrhea ; Created By:   Heidi Bowles LPN; Reaction Status:   Active ; Category:   Drug ; Substance:   Asacol ; Type:   Side Effect ; Updated By:   Heidi Bowles LPN; Source:   Patient ; Reviewed Date:   12/1/2021 8:08 AM CST        Medication List   (As Of: 12/1/2021 8:08:43 AM CST)   Prescription/Discharge Order    predniSONE  :   predniSONE ; Status:   Prescribed ; Ordered As Mnemonic:   predniSONE 10 mg oral tablet ; Simple Display Line:   40 mg, 4 tab(s), Oral, daily, for 7 day(s), 28 tab(s),  0 Refill(s) ; Ordering Provider:   Nidia Lozada; Catalog Code:   predniSONE ; Order Dt/Tm:   11/12/2021 9:37:03 AM CST          hydrocortisone-pramoxine topical  :   hydrocortisone-pramoxine topical ; Status:   Prescribed ; Ordered As Mnemonic:   Analpram-HC 2.5%-1% rectal cream ; Simple Display Line:   1 alexander, [place], TID, for 14 day(s), apply a thin film to nicki rectal area, 4 gm, 0 Refill(s) ; Ordering Provider:   Nidia Lozada; Catalog Code:   hydrocortisone-pramoxine topical ; Order Dt/Tm:   11/12/2021 8:54:05 AM CST          levonorgestrel  :   levonorgestrel ; Status:   Prescribed ; Ordered As Mnemonic:   Kyleena 19.5 mg intrauterine device ; Simple Display Line:   19.5 mg, 1 EA, Intrauteral, once, 1 EA, 0 Refill(s) ; Ordering Provider:   Cecil Benton MD; Catalog Code:   levonorgestrel ; Order Dt/Tm:   1/6/2021 10:37:27 AM CST ; Comment:   Placed 1/6/21  Lot [id number]  Exp 2/2022            Home Meds    adalimumab  :   adalimumab ; Status:   Documented ; Ordered As Mnemonic:   Humira 40 mg/0.8 mL subcutaneous kit ; Simple Display Line:   40 mg, 0.8 mL, subcutaneous, once, per Dr. Adry Vaca ; Catalog Code:   adalimumab ; Order Dt/Tm:   12/21/2012 4:10:05 PM CST

## 2022-02-16 NOTE — NURSING NOTE
Comprehensive Intake Entered On:  11/18/2021 12:52 PM CST    Performed On:  11/18/2021 12:49 PM CST by Heidi Bowles LPN               Summary   Chief Complaint :   wants to have a pap smear done - had an abnormal one last year   Menstrual Status :   Menarcheal   Weight Measured :   149 lb(Converted to: 149 lb 0 oz, 67.585 kg)    Height Measured :   68.25 in(Converted to: 5 ft 8 in, 173.35 cm)    Body Mass Index :   22.49 kg/m2   Body Surface Area :   1.8 m2   Systolic Blood Pressure :   116 mmHg   Diastolic Blood Pressure :   64 mmHg   Mean Arterial Pressure :   81 mmHg   Peripheral Pulse Rate :   79 bpm   BP Site :   Right arm   BP Method :   Manual   Temperature Tympanic :   98.3 DegF(Converted to: 36.8 DegC)    Heidi Bowles LPN - 11/18/2021 12:49 PM CST   Health Status   Allergies Verified? :   Yes   Medication History Verified? :   Yes   Medical History Verified? :   No   Pre-Visit Planning Status :   Completed   Tobacco Use? :   Never smoker   Heidi Bowles LPN - 11/18/2021 12:49 PM CST   Meds / Allergies   (As Of: 11/18/2021 12:52:54 PM CST)   Allergies (Active)   Asacol  Estimated Onset Date:   Unspecified ; Reactions:   Diarrhea ; Created By:   Heidi Bowles LPN; Reaction Status:   Active ; Category:   Drug ; Substance:   Asacol ; Type:   Side Effect ; Updated By:   Heidi Bowles LPN; Source:   Patient ; Reviewed Date:   7/5/2021 11:25 AM CDT        Medication List   (As Of: 11/18/2021 12:52:54 PM CST)   Prescription/Discharge Order    hydrocortisone-pramoxine topical  :   hydrocortisone-pramoxine topical ; Status:   Prescribed ; Ordered As Mnemonic:   Analpram-HC 2.5%-1% rectal cream ; Simple Display Line:   1 alexander, [place], TID, for 14 day(s), apply a thin film to nicki rectal area, 4 gm, 0 Refill(s) ; Ordering Provider:   Nidia Lozada; Catalog Code:   hydrocortisone-pramoxine topical ; Order Dt/Tm:   11/12/2021 8:54:05 AM CST          predniSONE  :   predniSONE ; Status:    Prescribed ; Ordered As Mnemonic:   predniSONE 10 mg oral tablet ; Simple Display Line:   40 mg, 4 tab(s), Oral, daily, for 7 day(s), 28 tab(s), 0 Refill(s) ; Ordering Provider:   Nidia Lozada; Catalog Code:   predniSONE ; Order Dt/Tm:   11/12/2021 9:37:03 AM CST          levonorgestrel  :   levonorgestrel ; Status:   Prescribed ; Ordered As Mnemonic:   Kyleena 19.5 mg intrauterine device ; Simple Display Line:   19.5 mg, 1 EA, Intrauteral, once, 1 EA, 0 Refill(s) ; Ordering Provider:   Cecil Benton MD; Catalog Code:   levonorgestrel ; Order Dt/Tm:   1/6/2021 10:37:27 AM CST ; Comment:   Placed 1/6/21  Lot [id number]  Exp 2/2022            Home Meds    adalimumab  :   adalimumab ; Status:   Documented ; Ordered As Mnemonic:   Humira 40 mg/0.8 mL subcutaneous kit ; Simple Display Line:   40 mg, 0.8 mL, subcutaneous, once, per Dr. Adry Vaca ; Catalog Code:   adalimumab ; Order Dt/Tm:   12/21/2012 4:10:05 PM CST

## 2022-02-16 NOTE — TELEPHONE ENCOUNTER
---------------------  From: Nidia Lozada   To: MADHU SPANGLER    Sent: 7/15/2021 3:10:02 PM CDT  Subject: General Message     Maykel Gutierrez,  The urine culture is negative for bacteria (no infection).  The other testing we did is negative.    If your symptoms persist, a next step would be a pelvic exam to check your IUD.  Thanks,    ELICEO Stephens      Results:  Date Result Name Value Ref Range   7/13/2021 1:20 PM Chlam/N. gonorrhea Comments See comment    7/13/2021 1:20 PM Urine Culture See comment    7/13/2021 1:20 PM Chlamydia RNA NOT DETECTED (NOT DETECTED - )   7/13/2021 1:20 PM Neisseria gonorrhoeae RNA NOT DETECTED (NOT DETECTED - )   7/13/2021 1:20 PM Trichomonas NOT DETECTED (NOT DETECTED - )

## 2022-02-16 NOTE — PROCEDURES
Accession Number:       [id number]  CLINICAL INFORMATION::     None given  LMP::     NONE GIVEN  PREV. PAP::     NONE GIVEN  PREV. BX::     NONE GIVEN  SOURCE::     None given  STATEMENT OF ADEQUACY::     Satisfactory for evaluation. Endocervical/transformation zone component absent. Age and/or menstrual status not provided  GENERAL CATEGORIZATION::     EPITHELIAL CELL ABNORMALITY  INTERPRETATION/RESULT::     Atypical Squamous Cells of Undetermined Significance (ASC-US)  COMMENT::     See comment       This Pap test has been evaluated with computer       assisted technology.       Suggest clinical correlation and follow-up as       clinically appropriate  CYTOTECHNOLOGIST::     GEORGES CT(ASCP) CT Screening location: [address] [address]  PATHOLOGIST::     See comment       Robert Noble M.D., Board Certified in Anatomic       Pathology, Clinical Pathology, Specializing in       Gynecological Pathology       6   (electronic signature)  COMMENT:     See comment       EXPLANATORY NOTE:         The Pap is a screening test for cervical cancer. It is       not a diagnostic test and is subject to false negative       and false positive results. It is most reliable when a       satisfactory sample, regularly obtained, is submitted       with relevant clinical findings and history, and when       the Pap result is evaluated along with historic and       current clinical information.  CHLAMYDIA TRACHOMATIS RNA, TMA, UROGENITAL:     NOT DETECTED  NEISSERIA GONORRHOEAE RNA, TMA, UROGENITAL:     NOT DETECTED  COMMENT:     See comment       The analytical performance characteristics of this       assay, when used to test SurePath(TM) specimens have been       determined by [employer]. The modifications have       not been cleared or approved by the FDA. This assay has       been validated pursuant to the CLIA regulations and is       used for clinical  purposes.         For additional information, please refer to       https://education.[employer]/faq/VHT355       (This link is being provided for information/       educational purposes only.)

## 2022-02-16 NOTE — TELEPHONE ENCOUNTER
---------------------  From: Viola Dong CMA   Sent: 1/6/2021 10:36:37 AM CST  Subject: med admin     Ibuprofen 600mg given orally at 1000 per TAW prior to colp and IUD insert procedure.   NDC [id number]  Lot [id number]  Exp 8/22

## 2022-02-16 NOTE — PROGRESS NOTES
Patient:   MADHU SPANGLER            MRN: [id number]            FIN: [id number]               Age:   26 years     Sex:  Female     :  1995   Associated Diagnoses:   Cervical cancer screening; Routine screening for STI (sexually transmitted infection)   Author:   Nidia Lozada      Chief Complaint   2021 12:49 PM CST  wants to have a pap smear done - had an abnormal one last year        History of Present Illness   had ASCUS pap 1 year ago with normal f/u colposcopy, advised to repap in 1 year, she is here for that today.  SOme vaginal DC, has kyleena IUD in place , inserted just under 1 year ago, and would like that checked. Same partner, rare periods with IUD      Health Status   Allergies:    Nonallergic Reactions (Selected)  Severity Not Documented  Asacol (Diarrhea)   Medications:  (Selected)   Prescriptions  Prescribed  Analpram-HC 2.5%-1% rectal cream: 1 [place], [place], TID, Instructions: apply a thin film to nicki rectal area, # 4 gm, 0 Refill(s), Type: Maintenance, Pharmacy: [facility], 1 [address] [place] tid,x14 day(s),Instr:apply a thin film to nicki rectal area, 68.25, in, 21 7:36:00 CST, Height Measured,...  Kyleena 19.5 mg intrauterine device: = 1 EA ( 19.5 mg ), Intrauteral, once, # 1 EA, 0 Refill(s), Type: Soft Stop, other reason (Rx)  predniSONE 10 mg oral tablet: = 4 tab(s) ( 40 mg ), Oral, daily, # 28 tab(s), 0 Refill(s), Type: Maintenance, Pharmacy: [facility], 4 tab(s) Oral daily,x7 day(s), 68.25, in, 21 7:36:00 CST, Height Measured, 149, lb, 21 8:14:00 CST, Weight Measured  Documented Medications  Documented  Humira 40 mg/0.8 mL subcutaneous kit: 0.8 mL ( 40 mg ), subcutaneous, once, Instructions: per Dr. Adry Vaca, 0 Refill(s), Type: Maintenance   Problem list:    All Problems  Duodenitis / ICD-9-.60 / Confirmed  Crohn disease / SNOMED CT 65667136 / Confirmed  ASCUS with positive high risk HPV cervical / SNOMED CT 720255371 / Confirmed  referred for  colposcopy  Resolved: Otitis Media / ICD-9-.9  Canceled: Crohns Disease / ICD-9-.9      Histories   Past Medical History:    Active  Crohn disease (12904555)  Resolved  Otitis Media (382.9): Onset in 2009 at 13 years.  Resolved.   Family History:    Heart  Sister  Hypothyroid  Mother  Diabetes mellitus  Sister  Asthma  Mother  Mental health disorder  Father  Alzheimer's disease  Grandmother (P)  Kidney Disease  Father     Procedure history:    Insertion of IUD (SNOMED CT 206554850) performed by Cecil Benton MD on 1/6/2021 at 25 Years.  Comments:  1/6/2021 10:39 AM CST - Iker AGUAYOViola IUD  Lot [id number]  Exp 2/2022  Esophagogastroduodenoscopy (SNOMED CT 017128428) performed by Ronald Ga MD on 12/5/2011 at 16 Years.  Colonoscopy (SNOMED CT 017255961) on 12/5/2011 at 16 Years.  None (SNOMED CT 214889713).  History of hemicolectomy (SNOMED CT 5170707174).   Social History:        Electronic Cigarette/Vaping Assessment            Electronic Cigarette Use: Never.      Alcohol Assessment            Current, Beer (12 oz), Wine (5 oz), 1-2 times per week, 1 drinks/episode average.  2 drinks/episode maximum.               Ready to change: No.      Tobacco Assessment            Never (less than 100 in lifetime)      Substance Abuse Assessment            Current, Marijuana, 1-2 times per year, Ready to change: No.      Home and Environment Assessment            Marital status: Single.  Living situation: Home/Independent.  Injuries/Abuse/Neglect in household: No.  Feels               unsafe at home: No.  Family/Friends available for support: Yes.      Nutrition and Health Assessment            Type of diet: Regular.  Wants to lose weight: No.  Sleeping concerns: No.  Feels highly stressed: No.      Exercise and Physical Activity Assessment            Exercise frequency: 1-2 times/week.  Exercise type: Yoga.      Sexual Assessment            Sexually active: Yes.  Identifies as female, Sexual  orientation: Straight or heterosexual.  History of STD:               No.  Contraceptive Use Details: Vaginal ring.  History of sexual abuse: No.        Physical Examination   Vital Signs   11/18/2021 12:49 PM CST Temperature Tympanic 98.3 DegF    Peripheral Pulse Rate 79 bpm    Systolic Blood Pressure 116 mmHg    Diastolic Blood Pressure 64 mmHg    Mean Arterial Pressure 81 mmHg    BP Site Right arm    BP Method Manual      Measurements from flowsheet : Measurements   11/18/2021 12:49 PM CST Height Measured - Standard 68.25 in    Weight Measured - Standard 149 lb    BSA 1.8 m2    Body Mass Index 22.49 kg/m2      General:  Alert and oriented.    Genitourinary:  No inguinal tenderness, pelvic exam reveals no inguinal lymph nodes palpable, no external lesions, no odor no discharge. Introitus normal with vault with normal rugae, cervix visualized and clear. No cervical motion tenderness, no adnexal tenderness or masses  .       Impression and Plan   Diagnosis     Cervical cancer screening (MVD15-YS Z12.4).     Routine screening for STI (sexually transmitted infection) (POT52-ML Z11.3).     Plan:  wait for lab results, given her immunosuppressed state, even if normal she should consider yearly paps.    Patient Instructions:       Counseled: Patient, Regarding diagnosis, Regarding treatment, Regarding medications, Verbalized understanding.    Orders     Orders (Selected)   Outpatient Orders  Ordered (Dispatched)  Thinprep tis pap reflex hpv mRNA Chlamydia/N.Gonorrhoeae* ([facility]): Specimen Type: Pap, Collection Date: 11/18/21 13:04:00 CST.

## 2022-02-16 NOTE — NURSING NOTE
Comprehensive Intake Entered On:  1/6/2021 10:00 AM CST    Performed On:  1/6/2021 9:55 AM CST by Viola Dong CMA               Summary   Chief Complaint :   Colposcopy per [facility]. ASCUS 12/21/20. IUD insert Kyleena or Meenu. Currently using NuvaRing.   Menstrual Status :   Menarcheal   Ht/Wt Measurement Refused by Patient? :   Yes   Systolic Blood Pressure :   110 mmHg   Diastolic Blood Pressure :   62 mmHg   Mean Arterial Pressure :   78 mmHg   Peripheral Pulse Rate :   72 bpm   BP Site :   Right arm   Pulse Site :   Radial artery   BP Method :   Manual   HR Method :   Manual   Viola Dong CMA - 1/6/2021 9:55 AM CST   Health Status   Allergies Verified? :   Yes   Medication History Verified? :   Yes   Pre-Visit Planning Status :   Completed   Tobacco Use? :   Never smoker   Viola Dong CMA - 1/6/2021 9:55 AM CST   Meds / Allergies   (As Of: 1/6/2021 10:00:25 AM CST)   Allergies (Active)   Asacol  Estimated Onset Date:   Unspecified ; Reactions:   Diarrhea ; Created By:   Heidi Bowles LPN; Reaction Status:   Active ; Category:   Drug ; Substance:   Asacol ; Type:   Side Effect ; Updated By:   Heidi Bowles LPN; Source:   Patient ; Reviewed Date:   7/7/2020 1:08 PM CDT        Medication List   (As Of: 1/6/2021 10:00:26 AM CST)   Prescription/Discharge Order    ethinyl estradiol-etonogestrel  :   ethinyl estradiol-etonogestrel ; Status:   Prescribed ; Ordered As Mnemonic:   NuvaRing 0.120 mg-0.015 mg/24 hours vaginal ring ; Simple Display Line:   1 EA, Vaginal, q4 wks, 1 EA, 0 Refill(s) ; Ordering Provider:   Edmar Bermudez MD; Catalog Code:   ethinyl estradiol-etonogestrel ; Order Dt/Tm:   12/16/2020 5:46:59 PM CST            Home Meds    adalimumab  :   adalimumab ; Status:   Documented ; Ordered As Mnemonic:   Humira 40 mg/0.8 mL subcutaneous kit ; Simple Display Line:   40 mg, 0.8 mL, subcutaneous, once, per Dr. Adry Vaca ; Catalog Code:   adalimumab ; Order Dt/Tm:   12/21/2012 4:10:05  PM CST            ID Risk Screen   Recent Travel History :   No recent travel   Family Member Travel History :   No recent travel   Other Exposure to Infectious Disease :   Unknown   Viola Dong CMA - 1/6/2021 9:55 AM CST

## 2022-02-16 NOTE — NURSING NOTE
Comprehensive Intake Entered On:  7/13/2021 12:54 PM CDT    Performed On:  7/13/2021 12:51 PM CDT by Heidi Bowles LPN               Summary   Chief Complaint :   possible UTI, dx about 1 month ago, has taken 2 rounds of antibiotic and feels like something is still off   Menstrual Status :   Menarcheal   Weight Measured :   151.7 lb(Converted to: 151 lb 11 oz, 68.810 kg)    Height Measured :   68.25 in(Converted to: 5 ft 8 in, 173.35 cm)    Body Mass Index :   22.89 kg/m2   Body Surface Area :   1.82 m2   Systolic Blood Pressure :   116 mmHg   Diastolic Blood Pressure :   64 mmHg   Mean Arterial Pressure :   81 mmHg   Peripheral Pulse Rate :   77 bpm   BP Site :   Right arm   BP Method :   Manual   Temperature Tympanic :   97.8 DegF(Converted to: 36.6 DegC)  (LOW)    Oxygen Saturation :   99 %   Heidi Bowles LPN - 7/13/2021 12:51 PM CDT   Health Status   Allergies Verified? :   Yes   Medication History Verified? :   Yes   Medical History Verified? :   No   Pre-Visit Planning Status :   Completed   Tobacco Use? :   Never smoker   Heidi Bowles LPN - 7/13/2021 12:51 PM CDT   Meds / Allergies   (As Of: 7/13/2021 12:54:28 PM CDT)   Allergies (Active)   Asacol  Estimated Onset Date:   Unspecified ; Reactions:   Diarrhea ; Created By:   Heidi Bowles LPN; Reaction Status:   Active ; Category:   Drug ; Substance:   Asacol ; Type:   Side Effect ; Updated By:   Heidi Bowles LPN; Source:   Patient ; Reviewed Date:   7/5/2021 11:25 AM CDT        Medication List   (As Of: 7/13/2021 12:54:28 PM CDT)   Prescription/Discharge Order    levonorgestrel  :   levonorgestrel ; Status:   Prescribed ; Ordered As Mnemonic:   Kyleena 19.5 mg intrauterine device ; Simple Display Line:   19.5 mg, 1 EA, Intrauteral, once, 1 EA, 0 Refill(s) ; Ordering Provider:   Cecil Benton MD; Catalog Code:   levonorgestrel ; Order Dt/Tm:   1/6/2021 10:37:27 AM CST ; Comment:   Placed 1/6/21  Lot [id number]  Exp 2/2022             Home Meds    adalimumab  :   adalimumab ; Status:   Documented ; Ordered As Mnemonic:   Humira 40 mg/0.8 mL subcutaneous kit ; Simple Display Line:   40 mg, 0.8 mL, subcutaneous, once, per Dr. Adry Vaca ; Catalog Code:   adalimumab ; Order Dt/Tm:   12/21/2012 4:10:05 PM CST

## 2022-02-16 NOTE — TELEPHONE ENCOUNTER
---------------------  From: Nidia Lozada   To: MADHU SPANGLER    Sent: 11/24/2021 2:49:20 PM CST  Subject: General Message         Maykel Gutierrez,  I left you a phone message. Your pap smear again is ASCUS with high risk HPV detected. That is not worse than last year, but it is not improved and the guidelines for cervical cancer screening recommend a repeat COLPOSCOPY.  That can be done here with Dr Chao Benton at your convenience (he did the colposcopy for you last time and put in your IUD).    Please reply so I know you received the message, thank you.    ELICEO Stephens

## 2022-02-16 NOTE — PROCEDURES
Accession Number:       [id number]  CLINICAL INFORMATION::     Intrauterine contraceptive device  LMP::     IUD  PREV. PAP::     [id number] ASCUS HPV+  PREV. BX::     NONE GIVEN  SOURCE::     None given  STATEMENT OF ADEQUACY::     Satisfactory for evaluation. Endocervical/transformation zone component present.  GENERAL CATEGORIZATION::     EPITHELIAL CELL ABNORMALITY  INTERPRETATION/RESULT::     Atypical Squamous Cells of Undetermined Significance (ASC-US)  INFECTION::     Fungal organisms morphologically consistent with Candida spp.  COMMENT::     This Pap test has been evaluated with computer assisted technology.  CYTOTECHNOLOGIST::     CONNOR OGDEN(ASCP) CT Screening location: [address] [address]  PATHOLOGIST::     Marietta Fernandez MD Board Certified in Anatomic Pathology and Clinical Pathology 2  (electronic signature)  COMMENT:     See comment       EXPLANATORY NOTE:         The Pap is a screening test for cervical cancer. It is       not a diagnostic test and is subject to false negative       and false positive results. It is most reliable when a       satisfactory sample, regularly obtained, is submitted       with relevant clinical findings and history, and when       the Pap result is evaluated along with historic and       current clinical information.  CHLAMYDIA TRACHOMATIS RNA, TMA, UROGENITAL:     NOT DETECTED  NEISSERIA GONORRHOEAE RNA, TMA, UROGENITAL:     NOT DETECTED  COMMENT:     See comment       The analytical performance characteristics of this       assay, when used to test SurePath(TM) specimens have been       determined by [employer]. The modifications have       not been cleared or approved by the FDA. This assay has       been validated pursuant to the CLIA regulations and is       used for clinical purposes.         For additional information, please refer to       https://education.[employer]/faq/WFM585       (This  link is being provided for information/       educational purposes only.)

## 2022-02-16 NOTE — NURSING NOTE
Comprehensive Intake Entered On:  11/4/2019 5:42 PM CST    Performed On:  11/4/2019 5:39 PM CST by Heidi Bowles LPN               Summary   Chief Complaint :   here for skin exam - has some moles that she is concerned about   Menstrual Status :   Menarcheal   Weight Measured :   155.2 lb(Converted to: 155 lb 3 oz, 70.40 kg)    Systolic Blood Pressure :   108 mmHg   Diastolic Blood Pressure :   64 mmHg   Mean Arterial Pressure :   79 mmHg   Peripheral Pulse Rate :   86 bpm   BP Site :   Right arm   BP Method :   Manual   Temperature Tympanic :   98.9 DegF(Converted to: 37.2 DegC)    Oxygen Saturation :   98 %   Heidi Bowles LPN - 11/4/2019 5:39 PM CST   Health Status   Allergies Verified? :   Yes   Medication History Verified? :   Yes   Medical History Verified? :   No   Pre-Visit Planning Status :   Completed   Tobacco Use? :   Never smoker   Heidi Bowles LPN - 11/4/2019 5:39 PM CST   Meds / Allergies   (As Of: 11/4/2019 5:42:53 PM CST)   Allergies (Active)   No known allergies  Estimated Onset Date:   Unspecified ; Created By:   Michelle Vargas; Reaction Status:   Active ; Category:   Drug ; Substance:   No known allergies ; Type:   Allergy ; Updated By:   Michelle Vargas; Source:   Patient ; Reviewed Date:   12/23/2015 10:23 AM CST        Medication List   (As Of: 11/4/2019 5:42:53 PM CST)   Home Meds    azaTHIOprine  :   azaTHIOprine ; Status:   Documented ; Ordered As Mnemonic:   azaTHIOprine 50 mg oral tablet ; Simple Display Line:   50 mg, 1 tab(s), Oral, daily, 0 Refill(s) ; Catalog Code:   azaTHIOprine ; Order Dt/Tm:   11/4/2019 5:40:50 PM CST          adalimumab  :   adalimumab ; Status:   Documented ; Ordered As Mnemonic:   Humira 40 mg/0.8 mL subcutaneous kit ; Simple Display Line:   40 mg, 0.8 mL, subcutaneous, once, per Dr. Adry Vaca ; Catalog Code:   adalimumab ; Order Dt/Tm:   12/21/2012 4:10:05 PM CST          ethinyl estradiol-etonogestrel  :   ethinyl estradiol-etonogestrel  ; Status:   Documented ; Ordered As Mnemonic:   NuvaRing ; Simple Display Line:   1 EA, vag, q4 wks, 0 Refill(s) ; Catalog Code:   ethinyl estradiol-etonogestrel ; Order Dt/Tm:   11/24/2015 4:08:39 PM CST

## 2022-02-16 NOTE — NURSING NOTE
Comprehensive Intake Entered On:  10/7/2021 2:22 PM CDT    Performed On:  10/7/2021 2:21 PM CDT by Destinee Zapata CMA               Summary   Chief Complaint :   Phone visiit - Hx Crohn's disease and is treated with Humira.  Pt states that she has a low grade fever, chills, dry cough, congestion, chest pressure and some SOB x Tues. Tested + for Covid 10/6-   Menstrual Status :   Menarcheal   Height Measured :   68.25 in(Converted to: 5 ft 8 in, 173.35 cm)    Destinee Zapata CMA - 10/7/2021 2:21 PM CDT

## 2022-02-16 NOTE — NURSING NOTE
Comprehensive Intake Entered On:  12/21/2020 5:39 PM CST    Performed On:  12/21/2020 5:35 PM CST by Shanna Bonds CMA               Summary   Chief Complaint :   due pap, would like to discuss birth control   Menstrual Status :   Menarcheal   Weight Measured :   152 lb(Converted to: 152 lb 0 oz, 68.946 kg)    Height Measured :   68.25 in(Converted to: 5 ft 8 in, 173.35 cm)    Body Mass Index :   22.94 kg/m2   Body Surface Area :   1.82 m2   Systolic Blood Pressure :   116 mmHg   Diastolic Blood Pressure :   72 mmHg   Mean Arterial Pressure :   87 mmHg   Peripheral Pulse Rate :   74 bpm   Shanna Bonds CMA - 12/21/2020 5:35 PM CST   Health Status   Allergies Verified? :   Yes   Medication History Verified? :   Yes   Pre-Visit Planning Status :   Completed   Tobacco Use? :   Never smoker   Shanna Bonds CMA - 12/21/2020 5:35 PM CST   Consents   Consent for Immunization Exchange :   Consent Granted   Consent for Immunizations to Providers :   Consent Granted   Shanna Bonds CMA - 12/21/2020 5:35 PM CST   Meds / Allergies   (As Of: 12/21/2020 5:39:18 PM CST)   Allergies (Active)   Asacol  Estimated Onset Date:   Unspecified ; Reactions:   Diarrhea ; Created By:   Heidi Bowles LPN; Reaction Status:   Active ; Category:   Drug ; Substance:   Asacol ; Type:   Side Effect ; Updated By:   Heidi Bowles LPN; Source:   Patient ; Reviewed Date:   7/7/2020 1:08 PM CDT        Medication List   (As Of: 12/21/2020 5:39:18 PM CST)   Prescription/Discharge Order    ethinyl estradiol-etonogestrel  :   ethinyl estradiol-etonogestrel ; Status:   Prescribed ; Ordered As Mnemonic:   NuvaRing 0.120 mg-0.015 mg/24 hours vaginal ring ; Simple Display Line:   1 EA, Vaginal, q4 wks, 1 EA, 0 Refill(s) ; Ordering Provider:   Edmar Bermudez MD; Catalog Code:   ethinyl estradiol-etonogestrel ; Order Dt/Tm:   12/16/2020 5:46:59 PM CST            Home Meds    adalimumab  :   adalimumab ; Status:    Documented ; Ordered As Mnemonic:   Humira 40 mg/0.8 mL subcutaneous kit ; Simple Display Line:   40 mg, 0.8 mL, subcutaneous, once, per Dr. Adry Vaca ; Catalog Code:   adalimumab ; Order Dt/Tm:   12/21/2012 4:10:05 PM CST            ID Risk Screen   Recent Travel History :   No recent travel   Family Member Travel History :   No recent travel   Other Exposure to Infectious Disease :   Unknown   Shanna Bonds CMA - 12/21/2020 5:35 PM CST

## 2022-02-16 NOTE — NURSING NOTE
Comprehensive Intake Entered On:  11/12/2021 7:38 AM CST    Performed On:  11/12/2021 7:36 AM CST by Heidi Bowles LPN               Summary   Weight Measured :   149 lb(Converted to: 149 lb 0 oz, 67.585 kg)    Body Mass Index :   22.49 kg/m2   Body Surface Area :   1.8 m2   Systolic Blood Pressure :   120 mmHg   Diastolic Blood Pressure :   76 mmHg   Mean Arterial Pressure :   91 mmHg   Peripheral Pulse Rate :   98 bpm   BP Site :   Right arm   BP Method :   Manual   Temperature Tympanic :   97.7 DegF(Converted to: 36.5 DegC)  (LOW)    Oxygen Saturation :   99 %   Heidi Bowles LPN - 11/12/2021 8:14 AM CST   Chief Complaint :   crohns flare up, a lot of blood in the stool, started about a week ago - verbal consent for telephone visit     Menstrual Status :   Menarcheal   Height Measured :   68.25 in(Converted to: 5 ft 8 in, 173.35 cm)    Heidi Bowles LPN - 11/12/2021 7:36 AM CST   Health Status   Allergies Verified? :   Yes   Medication History Verified? :   Yes   Medical History Verified? :   No   Pre-Visit Planning Status :   Completed   Tobacco Use? :   Never smoker   Heidi Bowles LPN - 11/12/2021 7:36 AM CST   Meds / Allergies   (As Of: 11/12/2021 8:16:02 AM CST)   Allergies (Active)   Asacol  Estimated Onset Date:   Unspecified ; Reactions:   Diarrhea ; Created By:   Heidi Bowles LPN; Reaction Status:   Active ; Category:   Drug ; Substance:   Asacol ; Type:   Side Effect ; Updated By:   Heidi Bowles LPN; Source:   Patient ; Reviewed Date:   7/5/2021 11:25 AM CDT        Medication List   (As Of: 11/12/2021 8:16:03 AM CST)   Prescription/Discharge Order    levonorgestrel  :   levonorgestrel ; Status:   Prescribed ; Ordered As Mnemonic:   Kyleena 19.5 mg intrauterine device ; Simple Display Line:   19.5 mg, 1 EA, Intrauteral, once, 1 EA, 0 Refill(s) ; Ordering Provider:   Cecil Benton MD; Catalog Code:   levonorgestrel ; Order Dt/Tm:   1/6/2021 10:37:27 AM CST ; Comment:    Placed 1/6/21  Lot [id number]  Exp 2/2022            Home Meds    adalimumab  :   adalimumab ; Status:   Documented ; Ordered As Mnemonic:   Humira 40 mg/0.8 mL subcutaneous kit ; Simple Display Line:   40 mg, 0.8 mL, subcutaneous, once, per Dr. Adry Vaca ; Catalog Code:   adalimumab ; Order Dt/Tm:   12/21/2012 4:10:05 PM CST

## 2022-02-16 NOTE — PROGRESS NOTES
Chief Complaint    fells that UTI is back, Urine frequency started again this morning.  wondering if could get a refill of antibioitcs or change them. Verbal consent for Video visit.   Visit type:  Video visit via [email or web address] or MyPrepApp   Participants in room during visit:  patient   Location of patient:  home   Location of physician:  office   Video start time:  1100   Video end time:  1155   Today's visit was conducted by video conference due to the COVID-19 pandemic.  The patient's consent to proceed with the video conference was obtained and documented.  History of Present Illness      Madhu has a one day h/o dysuria, no fever, back pain or abdominal pain.  She was treated for UTI about three weeks ago.  Review of Systems          ROS reviewed and negative except for symptoms noted in HPI.  Physical Exam      Appears well, NAD  Assessment/Plan       1. Dysuria (R30.0)         treat as acute cystitis with trimeth/sulfa        Appt with UA/UC if symptoms do not resolve       Orders:         sulfamethoxazole-trimethoprim, 1 tab(s), PO, BID, x 5 day(s), # 10 tab(s), 1 Refill(s), Type: Acute, Pharmacy: [id number], 1 tab(s) Oral bid,x5 day(s), 68.25, in, 12/21/20 17:35:00 CST, Height Measured, 152, lb, 12/21/20 17:35:00 CST, Weight Measured, (Ordered)  Patient Information     Name:MADHU SPANGLER      Address:      [address] [place] [id number]     Sex:Female     YOB: 1995     [phone]     Emergency Contact:SUSANNE SPANGLER     MRN:[id number]     FIN:[id number]     Location:[facility]     Date of Service:07/05/2021      Primary Care Physician:       Zac Correa MD, [phone]      Attending Physician:       Zac Correa MD, [phone]  Problem List/Past Medical History    Ongoing     ASCUS with positive high risk HPV cervical       Comments: referred for colposcopy     Crohn disease     Duodenitis    Historical     Otitis  Media  Procedure/Surgical History     Insertion of IUD (01/06/2021)            Comments: Kyleena IUD      Lot [id number]      Exp 2/2022.     Colonoscopy (12/05/2011)           Esophagogastroduodenoscopy (12/05/2011)           History of hemicolectomy           None        Medications    Humira 40 mg/0.8 mL subcutaneous kit, 40 mg= 0.8 mL, Subcutaneous, once    Kyleena 19.5 mg intrauterine device, 19.5 mg= 1 EA, Intrauteral, once    sulfamethoxazole-trimethoprim 800 mg-160 mg oral tablet, 1 tab(s), Oral, bid, 1 refills  Allergies    Asacol (Diarrhea)  Social History    Smoking Status     Never smoker     Alcohol      Current, Beer (12 oz), Wine (5 oz), 1-2 times per week, 1 drinks/episode average. 2 drinks/episode maximum. Ready to change: No.     Electronic Cigarette/Vaping      Electronic Cigarette Use: Never.     Exercise      Exercise frequency: 1-2 times/week. Exercise type: Yoga.     Home/Environment      Marital status: Single. Living situation: Home/Independent. Injuries/Abuse/Neglect in household: No. Feels unsafe at home: No. Family/Friends available for support: Yes.     Nutrition/Health      Type of diet: Regular. Wants to lose weight: No. Sleeping concerns: No. Feels highly stressed: No.     Sexual      Sexually active: Yes. Identifies as female, Sexual orientation: Straight or heterosexual. History of STD: No. Contraceptive Use Details: Vaginal ring. History of sexual abuse: No.     Substance Abuse      Current, Marijuana, 1-2 times per year, Ready to change: No.     Tobacco      Never (less than 100 in lifetime)  Family History    Alzheimer's disease: Grandmother (P).    Asthma: Mother.    Diabetes mellitus: Sister.    Heart: Sister.    Hypothyroid: Mother.    Kidney Disease: Father.    Mental health disorder: Father.  Immunizations       Scheduled Immunizations       Dose Date(s)       human papillomavirus vaccine       06/12/2012, 08/31/2012, 12/21/2012       influenza virus vaccine, inactivated        10/04/2013, 10/24/2008, 09/28/2009, 10/01/2013, 10/10/2019       meningococcal conjugate vaccine       08/31/2012       tetanus/diphth/pertuss (Tdap) adult/adol       01/01/2011       varicella       09/29/2010       Other Immunizations               Hep B       01/23/1998, 02/24/1998, 08/12/1998       influenza       09/29/2010       MMR (measles/mumps/rubella)       04/18/1994, 08/12/1998       varicella       08/12/1998       OPV       1995, 1993, 04/18/1994, 08/12/1998       influenza virus vaccine, inactivated       10/05/2012, 12/23/2015, 12/16/2020       Hib (PRP-T)       1995, 1995, 1995, 04/18/1994       meningococcal conjugate vaccine       11/12/2005       tetanus/diphth/pertuss (Tdap) adult/adol       11/12/2005, 12/16/2020       DTaP       1995, 1995, 1993, 04/18/1994, 08/12/1998

## 2022-02-16 NOTE — LETTER
(Inserted Image. Unable to display)   January 04, 2021      MADHU SPANGLER  [address]  [place], [place] [id number]        Dear MADHU,      Thank you for selecting [facility] for your healthcare needs.       We spoke on the phone today, Madhu, regarding the abnormal pap (ASCUS with high risk strains of human papilloma virus) and the need to set up a COLPOSCOPY. I have included some information on that. Thank you.          Please contact me or my assistant at [phone] if you have any questions or concerns.     Sincerely,        EMILIE Stephens-NP  Family Nurse Practitioner

## 2022-02-16 NOTE — PROGRESS NOTES
Patient:   MADHU SPANGLER            MRN: [id number]            FIN: [id number]               Age:   26 years     Sex:  Female     :  1995   Associated Diagnoses:   COVID-19 in immunocompromised patient; Crohn disease   Author:   Roddy Johnson MD      Visit Information      Date of Service: 10/07/2021 11:18 am  Performing Location: [facility]  Encounter#: [id number]      Primary Care Provider (PCP):  Zac Correa MD    NPI# [id number]      Referring Provider:  Roddy Johnson MD    NPI# [id number]   Visit type:  Telephone Encounter.    Source of history:  Patient.    Location of patient:  Home  Call Start Time:   1045  Call End Time:    _1100      Chief Complaint   10/7/2021 2:21 PM CDT    Phone visiit - Hx Crohn's disease and is treated with Humira.  Pt states that she has a low grade fever, chills, dry cough, congestion, chest pressure and some SOB x Tues. Tested + for Covid 10/6-     _      History of Present Illness   Today's visit was conducted via telephone due to the COVID-19 pandemic. Patient's consent to telephone visit was obtained and documented.      Reason for visit:    I spoke with Madhu via telephone about recent diagnosis of COVID infection, testing positive on 10/6 with symptom onset on 10/5.  She has received vaccination (Pfizer) completed in .  She teaches at local grade school.  She has a h/o Crohn's disease and is maintained on q2 week Humira injections for disease maintenance.        Review of Systems   Constitutional:  Fever, Chills, Weakness, Fatigue, Decreased activity.    Respiratory:  Cough, No shortness of breath, No sputum production.    Cardiovascular:  No chest pain.    Gastrointestinal:  No nausea, No vomiting, No diarrhea.    Immunologic:  Immunocompromised.       Impression and Plan   Diagnosis     COVID-19 in immunocompromised patient (GVS54-SX U07.1).     Crohn disease (HZL89-IG K50.90).        Health Status   Allergies:    Nonallergic  Reactions (Selected)  Severity Not Documented  Asacol (Diarrhea)   Medications:  (Selected)   Prescriptions  Prescribed  Kyleena 19.5 mg intrauterine device: = 1 EA ( 19.5 mg ), Intrauteral, once, # 1 EA, 0 Refill(s), Type: Soft Stop, other reason (Rx)  Documented Medications  Documented  Humira 40 mg/0.8 mL subcutaneous kit: 0.8 mL ( 40 mg ), subcutaneous, once, Instructions: per Dr. Adry Vaca, 0 Refill(s), Type: Maintenance,    Medications          *denotes recorded medication          *Humira 40 mg/0.8 mL subcutaneous kit: 40 mg, 0.8 mL, subcutaneous, once, per Dr. Adry Vaca.          Kyleena 19.5 mg intrauterine device: 19.5 mg, 1 EA, Intrauteral, once, 1 EA, 0 Refill(s).       Problem list:    All Problems  ASCUS with positive high risk HPV cervical / SNOMED CT 400975173 / Confirmed  Crohn disease / SNOMED CT 49413925 / Confirmed  Duodenitis / ICD-9-.60 / Confirmed      Histories   Past Medical History:    Active  Crohn disease (20428579)  Resolved  Otitis Media (382.9): Onset in 2009 at 13 years.  Resolved.   Family History:    Heart  Sister  Hypothyroid  Mother  Diabetes mellitus  Sister  Asthma  Mother  Mental health disorder  Father  Alzheimer's disease  Grandmother (P)  Kidney Disease  Father     Procedure history:    Insertion of IUD (713129053) on 1/6/2021 at 25 Years.  Comments:  1/6/2021 10:39 AM ADELA Dong CMA [place]  Kyleena IUD  Lot [id number]  Exp 2/2022  Esophagogastroduodenoscopy (963262850) on 12/5/2011 at 16 Years.  Colonoscopy (115634849) on 12/5/2011 at 16 Years.  None (716094155).  History of hemicolectomy (4577602901).   Social History:        Electronic Cigarette/Vaping Assessment            Electronic Cigarette Use: Never.      Alcohol Assessment            Current, Beer (12 oz), Wine (5 oz), 1-2 times per week, 1 drinks/episode average.  2 drinks/episode maximum.               Ready to change: No.      Tobacco Assessment            Never (less than 100 in lifetime)       Substance Abuse Assessment            Current, Marijuana, 1-2 times per year, Ready to change: No.      Home and Environment Assessment            Marital status: Single.  Living situation: Home/Independent.  Injuries/Abuse/Neglect in household: No.  Feels               unsafe at home: No.  Family/Friends available for support: Yes.      Nutrition and Health Assessment            Type of diet: Regular.  Wants to lose weight: No.  Sleeping concerns: No.  Feels highly stressed: No.      Exercise and Physical Activity Assessment            Exercise frequency: 1-2 times/week.  Exercise type: Yoga.      Sexual Assessment            Sexually active: Yes.  Identifies as female, Sexual orientation: Straight or heterosexual.  History of STD:               No.  Contraceptive Use Details: Vaginal ring.  History of sexual abuse: No.        Review / Management     .) COVID infection, PCR positive testing on 10/6 (symptom onset noticed on 10/5); vaccinated (completed Pfizer series in April, 2021)  - given chronic immunosuppression with TNF inhibitor for Crohn's disease, I would deem her eligible for monoclonal antibody treatment  - [facility] for Regeneron therapy  - review risk/benefits of monoclonal therapy and she consents to therapy

## 2022-02-16 NOTE — PROGRESS NOTES
Patient:   MADHU SPANGLER            MRN: [id number]            FIN: [id number]               Age:   24 years     Sex:  Female     :  1995   Associated Diagnoses:   Impacted cerumen, bilateral; TMJ tenderness, left   Author:   Per Martinez PA-C      Chief Complaint   2019 3:33 PM CST   c/o left ear pain/jaw for the past month, dizziness for the past week        History of Present Illness   Chief complaint and symptoms noted above and confirmed with patient   1 week hx of left ear pain and jawpain,  also some dizziness, sinus congestion  some jaw pain with eating  taking OTC severe cold med      Review of Systems   Constitutional:  Negative.    Ear/Nose/Mouth/Throat:  Nasal congestion, No sore throat.         Ear pain: Left.    Respiratory:  Negative.    Neurologic:  Dizziness.       Health Status   Allergies:    Nonallergic Reactions (All)  Severity Not Documented  Asacol (Diarrhea)  Canceled/Inactive Reactions (All)  No known allergies   Medications:  (Selected)   Documented Medications  Documented  Humira 40 mg/0.8 mL subcutaneous kit: 0.8 mL ( 40 mg ), subcutaneous, once, Instructions: per Dr. Adry Vaca, 0 Refill(s), Type: Maintenance  NuvaRin EA, vag, q4 wks, 0 Refill(s), Type: Maintenance  azaTHIOprine 50 mg oral tablet: = 1 tab(s) ( 50 mg ), Oral, daily, 0 Refill(s), Type: Maintenance   Problem list:    All Problems (Selected)  Duodenitis / ICD-9-.60 / Confirmed  Crohn disease / SNOMED CT 42015833 / Confirmed      Histories   Past Medical History:    Active  Crohn disease (59646887)  Resolved  Otitis Media (382.9): Onset in  at 13 years.  Resolved.   Family History:    Heart  Sister  Hypothyroid  Mother  Diabetes mellitus  Sister  Kidney Disease  Father     Procedure history:    Esophagogastroduodenoscopy (463253721) on 2011 at 16 Years.  Colonoscopy (283053093) on 2011 at 16 Years.  None (786723711).      Physical Examination   Vital Signs   2019 3:33 PM CST  Temperature Tympanic 98 DegF    Peripheral Pulse Rate 76 bpm    Pulse Site Radial artery    HR Method Manual    Systolic Blood Pressure 106 mmHg    Diastolic Blood Pressure 68 mmHg    Mean Arterial Pressure 81 mmHg    BP Site Right arm    BP Method Manual      Measurements from flowsheet : Measurements   12/27/2019 3:33 PM CST Height Measured - Standard 68.5 in    Weight Measured - Standard 150.8 lb    BSA 1.82 m2    Body Mass Index 22.59 kg/m2      General:  No acute distress.    HENT:  No pharyngeal erythema, No sinus tenderness, bilateral impacted cerumenosis; flushed with water and alcohol.  Afterwards canals are free of cerumen, TMs normal., nares are patent, pain over left TMJ.    Neck:  Supple, Non-tender, No lymphadenopathy.       Impression and Plan   Diagnosis     Impacted cerumen, bilateral (IOD21-GO H61.23).     TMJ tenderness, left (QEJ82-PN M26.622).     Summary:  the impacted cerumen is removed, for the TMJ pain will use medrol dosepak along with tylenol/ibuprofen and gentle heat, if not improving should follow up with dentist.    Orders     Orders   Pharmacy:  Medrol Dosepak 4 mg oral tablet (Prescribe): = 1 packet(s), Oral, once, Instructions: as directed on package labeling, # 21 tab(s), 0 Refill(s), Type: Soft Stop, Pharmacy: [facility] #[id number], 1 packet(s) Oral once,Instr:as directed on package labeling.     Orders   Charges (Evaluation and Management):  06697 office outpatient visit 15 minutes (Charge) (Order): Quantity: 1, Impacted cerumen, bilateral  TMJ tenderness, left.

## 2022-02-16 NOTE — PROGRESS NOTES
Patient:   MADHU SPANGLER            MRN: [id number]            FIN: [id number]               Age:   24 years     Sex:  Female     :  1995   Associated Diagnoses:   Skin cancer screening   Author:   Nidia Lozada      Visit Information      Date of Service: 2019 05:36 pm  Performing Location: [facility]  Encounter#: [id number]      Primary Care Provider (PCP):  Zac Correa MD    NPI# [id number]      Referring Provider:  Nidia Lozada    NPI# [id number]   Visit type:  General concerns.    Source of history:  Self.       Chief Complaint   would like skin cancer screening  Madhu saw me in  for skin cancer screening, photos from media tab reviewed  she continues to have new moles pop up  her dad was diagnosed with melanoma  she admits to many sun burns over the years, tho she is better about protecting her skin now.  she never used tanning beds  She has been living in [place] but now is teaching grade school at [employer]  she is on immunosuppressive therapy for chrons and was advised by her rheumatologist to seek yearly skin exams    when I last saw Madhu, an abrasion on the bridge of her nose was treated with cryotherapy, states it is a little scarred but did not return  she is concerned about a red spot on abdomen that is new and bleeds at times  she has no other lesions that bleed or itch or are new         History of Present Illness      Health Status   Allergies:    Allergic Reactions (Selected)  No known allergies   Medications:  (Selected)   Documented Medications  Documented  Humira 40 mg/0.8 mL subcutaneous kit: 0.8 mL ( 40 mg ), subcutaneous, once, Instructions: per Dr. Adry Vaca, 0 Refill(s), Type: Maintenance  NuvaRin EA, vag, q4 wks, 0 Refill(s), Type: Maintenance  azaTHIOprine 50 mg oral tablet: = 1 tab(s) ( 50 mg ), Oral, daily, 0 Refill(s), Type: Maintenance   Problem list:    All Problems  Crohn disease / SNOMED CT 25525532 /  Confirmed  Duodenitis / ICD-9-.60 / Confirmed  Resolved: Otitis Media / ICD-9-.9  Canceled: Crohns Disease / ICD-9-.9      Histories   Past Medical History:    Active  Crohn disease (76545931)  Resolved  Otitis Media (382.9): Onset in 2009 at 13 years.  Resolved.   Family History:    Heart  Sister  Hypothyroid  Mother  Diabetes mellitus  Sister  Kidney Disease  Father     Procedure history:    Esophagogastroduodenoscopy (SNOMED CT 196036291) performed by Ronald Ga MD on 12/5/2011 at 16 Years.  Colonoscopy (SNOMED CT 635607379) on 12/5/2011 at 16 Years.  None (SNOMED CT 531517231).   Social History:        Alcohol Assessment: Current            Current, 1-2 times per week      Tobacco Assessment: Denies Tobacco Use      Substance Abuse Assessment: Denies Substance Abuse      Employment and Education Assessment            Student                     Comments:                      11/22/2011 - Abigail FONTANEZ, [employer]      Exercise and Physical Activity Assessment: Does not exercise      Sexual Assessment: No Sexual Activity        Physical Examination   Vital Signs   11/4/2019 5:39 PM CST Temperature Tympanic 98.9 DegF    Peripheral Pulse Rate 86 bpm    Systolic Blood Pressure 108 mmHg    Diastolic Blood Pressure 64 mmHg    Mean Arterial Pressure 79 mmHg    BP Site Right arm    BP Method Manual    Oxygen Saturation 98 %      Measurements from flowsheet : Measurements   11/4/2019 5:39 PM CST    Weight Measured - Standard                155.2 lb     General:  Alert and oriented, Skin.         Appearance: Within normal limits.    Integumentary:  Warm, Dry, Intact.         Skin phototype: Skin phototype- II. Usually burns, sometimes tans. Fair skin, multiple nevi, most are smaller than 5 mm and flat, many have globular patter  .         Integumentary exam: General appearance of patient is well developed, alert and attentive, well nourished      Skin is examined and specific  lesions evaluated with dermoscope.     Conjunctiva and eye lids--no lesions of concern  scalp and face--no lesions of concern  Neck--no lesions of concern  Chest--no lesions of concern  Abdomen--no lesions of concern except small 2-3 mm raised pink area mid right low abdomen that appears as capillary hemangioma but has some concerning vasculature. She has no other capillary hemangiomas  back--no lesions of concern  right upper extremity--no lesions of concern  left upper extremity--no lesions of concern  right lower extremity--no lesions of concern  left lower extremity--no lesions of concern  Feet and hands--no lesions of concern.       Impression and Plan   Diagnosis     Skin cancer screening (WYL44-MO Z12.83).     Plan:  discussed sun protection, ABCDE of moles, follow up as needed, importance of no sunburns and yearly skin checks.    Patient Instructions:       Counseled: Patient, Verbalized understanding, will have her rtc for small shave of lesion that is worrisome to her right low abdomen as it bleeds at times. Suspect it is a capillary hemangioma but it does have some worrisome vaculature. She is agreeable to plan.    Orders     Orders   Requests (Return to Office):  Return to Clinic (Request) (Order): Return in within 4 weeks for shave removal abdomen.

## 2022-02-16 NOTE — NURSING NOTE
Urine Dipstick POC Entered On:  7/15/2021 8:04 AM CDT    Performed On:  7/13/2021 8:04 AM CDT by Luci Ling               Urine Dipstick POC   Urine Color Urine Dipstick :   Dark yellow   Urine Appearance Urine Dipstick :   Clear   Glucose Urine Dipstick :   Negative   Bilirubin Urine Dipstick :   Negative   Ketones Urine Dipstick :   Negative   Specific Gravity Urine Dipstick :   1.010   Blood Urine Dipstick :   Negative   pH Urine Dipstick :   5.5   Protein Urine Dipstick :   Negative   Urobilinogen Urine Dipstick :   0.2 mg/dl   Nitrite Urine Dipstick :   Negative   Leukocytes Urine Dipstick :   Negative   POC Test Comments :   Performed at [facility]   Luci Ling  7/15/2021 8:04 AM CDT

## 2022-02-16 NOTE — PROGRESS NOTES
Patient:   MADHU SPANGLER            MRN: [id number]            FIN: [id number]               Age:   26 years     Sex:  Female     :  1995   Associated Diagnoses:   Crohn's disease   Author:   Nidia Lozada      Visit Information      Date of Service: 2021 08:03 am  Performing Location: [facility]  Encounter#: [id number]      Primary Care Provider (PCP):  Zac Correa MD    NPI# [id number]      Referring Provider:  Nidia Lozada    NPI# [id number]      Chief Complaint   2021 7:36 AM CST   crohns flare up, a lot of blood in the stool, started about a week ago - verbal consent for telephone visit        History of Present Illness   started as phone visit but assessed that in clinic appt needed. She has had bright red blood in stool for a week, about 1-2 stools per day. She has had this in the past and can usually manage it without a visit to her gi specialist for her Chrons disease, Dr Gomes at [facility]. SHe called there earlier in the week and they sent her 'some forms to fill out' and told her when she returns them they could schedule a video appointment.  She states Dr Gomes did a colonoscopy for her last spring. Her Chrons has been under good control till she had a break through case of COVID 19 in October (she had been fully vaccinated) and her specialist told her to stop the Humira. She has since restarted it but now is having the rectal bleeding and her rectum is also sore due to the bleeding. She had to leave work to come here this morning, she his a  in . her employer is giving her a hard time about missing work.  She is not vomiting, has minimal abdominal pain, no dizziness but feels 'foggy'. She is drinking fluids well. I counseled her regarding liquid nourishment. She has had iron infusions in the past but not for a long time      Health Status   Allergies:    Nonallergic Reactions (Selected)  Severity Not Documented  Asacol  (Diarrhea)   Medications:  (Selected)   Prescriptions  Prescribed  Analpram-HC 2.5%-1% rectal cream: 1 alexander, RI, TID, Instructions: apply a thin film to nicki rectal area, # 4 gm, 0 Refill(s), Type: Maintenance, Pharmacy: [facility], 1 alexander RI tid,x14 day(s),Instr:apply a thin film to nicki rectal area, 68.25, in, 11/12/21 7:36:00 CST, Height Measured,...  Kyleena 19.5 mg intrauterine device: = 1 EA ( 19.5 mg ), Intrauteral, once, # 1 EA, 0 Refill(s), Type: Soft Stop, other reason (Rx)  predniSONE 10 mg oral tablet: = 4 tab(s) ( 40 mg ), Oral, daily, # 28 tab(s), 0 Refill(s), Type: Maintenance, Pharmacy: [facility], 4 tab(s) Oral daily,x7 day(s), 68.25, in, 11/12/21 7:36:00 CST, Height Measured, 149, lb, 11/12/21 8:14:00 CST, Weight Measured  Documented Medications  Documented  Humira 40 mg/0.8 mL subcutaneous kit: 0.8 mL ( 40 mg ), subcutaneous, once, Instructions: per Dr. Adry Vaca, 0 Refill(s), Type: Maintenance,    Medications          *denotes recorded medication          *Humira 40 mg/0.8 mL subcutaneous kit: 40 mg, 0.8 mL, subcutaneous, once, per Dr. Adry Vaca.          Analpram-HC 2.5%-1% rectal cream: 1 alexander, RI, TID, for 14 day(s), apply a thin film to nicki rectal area, 4 gm, 0 Refill(s).          Kyleena 19.5 mg intrauterine device: 19.5 mg, 1 EA, Intrauteral, once, 1 EA, 0 Refill(s).          predniSONE 10 mg oral tablet: 40 mg, 4 tab(s), Oral, daily, for 7 day(s), 28 tab(s), 0 Refill(s).       Problem list:    All Problems  Duodenitis / ICD-9-.60 / Confirmed  Crohn disease / SNOMED CT 81574020 / Confirmed  ASCUS with positive high risk HPV cervical / SNOMED CT 044140577 / Confirmed  referred for colposcopy  Resolved: Otitis Media / ICD-9-.9  Canceled: Crohns Disease / ICD-9-.9      Histories   Past Medical History:    Active  Crohn disease (81811510)  Resolved  Otitis Media (382.9): Onset in 2009 at 13 years.  Resolved.   Family History:     Heart  Sister  Hypothyroid  Mother  Diabetes mellitus  Sister  Asthma  Mother  Mental health disorder  Father  Alzheimer's disease  Grandmother (P)  Kidney Disease  Father     Procedure history:    Insertion of IUD (SNOMED CT 719972719) performed by Cecil Benton MD on 1/6/2021 at 25 Years.  Comments:  1/6/2021 10:39 AM ADELA - Viola Dong CMA IUD  Lot [id number]  Exp 2/2022  Esophagogastroduodenoscopy (SNOMED CT 902889251) performed by Ronald Ga MD on 12/5/2011 at 16 Years.  Colonoscopy (SNOMED CT 005628147) on 12/5/2011 at 16 Years.  None (SNOMED CT 067740456).  History of hemicolectomy (SNOMED CT 8094779549).   Social History:        Electronic Cigarette/Vaping Assessment            Electronic Cigarette Use: Never.      Alcohol Assessment            Current, Beer (12 oz), Wine (5 oz), 1-2 times per week, 1 drinks/episode average.  2 drinks/episode maximum.               Ready to change: No.      Tobacco Assessment            Never (less than 100 in lifetime)      Substance Abuse Assessment            Current, Marijuana, 1-2 times per year, Ready to change: No.      Home and Environment Assessment            Marital status: Single.  Living situation: Home/Independent.  Injuries/Abuse/Neglect in household: No.  Feels               unsafe at home: No.  Family/Friends available for support: Yes.      Nutrition and Health Assessment            Type of diet: Regular.  Wants to lose weight: No.  Sleeping concerns: No.  Feels highly stressed: No.      Exercise and Physical Activity Assessment            Exercise frequency: 1-2 times/week.  Exercise type: Yoga.      Sexual Assessment            Sexually active: Yes.  Identifies as female, Sexual orientation: Straight or heterosexual.  History of STD:               No.  Contraceptive Use Details: Vaginal ring.  History of sexual abuse: No.        Physical Examination   VS/Measurements   General:  Alert and oriented, No acute distress.    Eye:  Normal  conjunctiva, pink with quick cap refill.    Neck:  Supple, Non-tender.    Respiratory:  Lungs are clear to auscultation, Respirations are non-labored.    Cardiovascular:  Normal rate, Regular rhythm, No murmur.    Gastrointestinal:  Soft, Non-distended, Normal bowel sounds, No organomegaly, mild RLQ tenderness.    Integumentary:  Warm, Dry, Pink.       Impression and Plan   Diagnosis     Crohn's disease (ESA11-BI K50.90).     Plan:  stable, stay hydrated, start prednisone and call Dr Gomes next week for prednisone taper, to ER this weekend if any worsening.    Patient Instructions:       Counseled: Patient, Regarding diagnosis, Regarding treatment, Regarding medications, Verbalized understanding, Counseled on symptomatic management. Return to clinic for re evaluation if worsening, simply not improving, or failure to resolve.   .    Orders     Orders (Selected)   Prescriptions  Prescribed  Analpram-HC 2.5%-1% rectal cream: 1 alexander, [place], TID, Instructions: apply a thin film to nicki rectal area, # 4 gm, 0 Refill(s), Type: Maintenance, Pharmacy: [facility], 1 alexander IN tid,x14 day(s),Instr:apply a thin film to nicki rectal area, 68.25, in, 11/12/21 7:36:00 CST, Height Measured,...  predniSONE 10 mg oral tablet: = 4 tab(s) ( 40 mg ), Oral, daily, # 28 tab(s), 0 Refill(s), Type: Maintenance, Pharmacy: [facility], 4 tab(s) Oral daily,x7 day(s), 68.25, in, 11/12/21 7:36:00 CST, Height Measured, 149, lb, 11/12/21 8:14:00 CST, Weight Measured.

## 2022-02-16 NOTE — PROGRESS NOTES
Patient:   MADHU SPANGLER            MRN: [id number]            FIN: [id number]               Age:   25 years     Sex:  Female     :  1995   Associated Diagnoses:   Cervicitis; Encounter for insertion of intrauterine contraceptive device   Author:   Cecil Benton MD      Visit Information      Date of Service: 2021 09:43 am  Performing Location: [facility]  Encounter#: [id number]      Primary Care Provider (PCP):  Zac Correa MD    NPI# [id number]      Referring Provider:  Cecil Benton MD    NPI# [id number]      Chief Complaint   2021 9:55 AM CST     Colposcopy per [facility]. ASCUS 20. IUD insert Kyleena or Meenu. Currently using NuvaRing.      Interval History   The patient is in today for colposcopic evaluation of ASCUS Pap smear.  She had no previously abnormal Pap smears but had not had one for a couple of years.  She has been using the NuvaRing for birth control and would like to switch over to the IUD.  She saw ELICEO Stephens, for her annual visit and discussed pros and cons of IUD vs continuing the NuvaRing or going back to OCP.  She would like the lowest dose of intrauterine device and would like to have the option of leaving it in for five years.  She seems to understand the pros and cons of the IUD including irregular bleeding and discomfort.  She was screened for Chlamydia and gonorrhea.        Review of Systems   Review  of systems is negative except as documented under interval history.      Health Status   Allergies:    Nonallergic Reactions (Selected)  Severity Not Documented  Asacol (Diarrhea)   Medications:  (Selected)   Prescriptions  Prescribed  Kyleena 19.5 mg intrauterine device: = 1 EA ( 19.5 mg ), Intrauteral, once, # 1 EA, 0 Refill(s), Type: Soft Stop, other reason (Rx)  Documented Medications  Documented  Humira 40 mg/0.8 mL subcutaneous kit: 0.8 mL ( 40 mg ), subcutaneous, once, Instructions: per Dr. Adry Vaca, 0 Refill(s), Type: Maintenance    Problem list:    All Problems  Duodenitis / ICD-9-.60 / Confirmed  Crohn disease / SNOMED CT 06640692 / Confirmed  ASCUS with positive high risk HPV cervical / SNOMED CT 775146332 / Confirmed  Resolved: Otitis Media / ICD-9-.9  Canceled: Crohns Disease / ICD-9-.9      Histories   Past Medical History:    Active  Crohn disease (64079663)  Resolved  Otitis Media (382.9): Onset in 2009 at 13 years.  Resolved.   Family History:    Heart  Sister  Hypothyroid  Mother  Diabetes mellitus  Sister  Asthma  Mother  Mental health disorder  Father  Alzheimer's disease  Grandmother (P)  Kidney Disease  Father     Procedure history:    Insertion of IUD (439284550) on 1/6/2021 at 25 Years.  Comments:  1/6/2021 10:39 AM Viola Ybarra CMA IUD  Lot [id number]  Exp 2/2022  Esophagogastroduodenoscopy (882557738) on 12/5/2011 at 16 Years.  Colonoscopy (373169253) on 12/5/2011 at 16 Years.  None (650000814).  History of hemicolectomy (7115166299).   Social History:        Electronic Cigarette/Vaping Assessment            Electronic Cigarette Use: Never.      Alcohol Assessment            Current, Beer (12 oz), Wine (5 oz), 1-2 times per week, 1 drinks/episode average.  2 drinks/episode maximum.               Ready to change: No.      Tobacco Assessment            Never (less than 100 in lifetime)      Substance Abuse Assessment            Current, Marijuana, 1-2 times per year, Ready to change: No.      Home and Environment Assessment            Marital status: Single.  Living situation: Home/Independent.  Injuries/Abuse/Neglect in household: No.  Feels               unsafe at home: No.  Family/Friends available for support: Yes.      Nutrition and Health Assessment            Type of diet: Regular.  Wants to lose weight: No.  Sleeping concerns: No.  Feels highly stressed: No.      Exercise and Physical Activity Assessment            Exercise frequency: 1-2 times/week.  Exercise type: Yoga.      Sexual  Assessment            Sexually active: Yes.  Identifies as female, Sexual orientation: Straight or heterosexual.  History of STD:               No.  Contraceptive Use Details: Vaginal ring.  History of sexual abuse: No.        Physical Examination   Vital Signs   1/6/2021 9:55 AM CST Peripheral Pulse Rate 72 bpm    Pulse Site Radial artery    HR Method Manual    Systolic Blood Pressure 110 mmHg    Diastolic Blood Pressure 62 mmHg    Mean Arterial Pressure 78 mmHg    BP Site Right arm    BP Method Manual      Gynecologic:  The patient was seen in the colposcopy room.  Speculum was placed and colposcopy was performed.  The transformation zone is completely normal.  There was no acetowhite change during observation.  No biopsies were taken.    After consent betadine prep was done of the cervix.  The cervix is grasped with tenaculum and sounded.  The IUD was placed according to the 's instructions, Kyleena brand was used.  The process was well tolerated.  .       Impression and Plan   Diagnosis     Cervicitis (UJO90-YA N72).     Encounter for insertion of intrauterine contraceptive device (IRL87-FB Z30.430).     IUD placement with no evidence of JL on colposcopic evaluation..     Plan:  The patient will return for Pap smear next year..    Patient Instructions:       Counseled: Patient, Regarding diagnosis, Regarding treatment, Verbalized understanding.

## 2022-02-16 NOTE — TELEPHONE ENCOUNTER
---------------------  From: Nidia Lozada   To: Tory Prater MD;     Sent: 11/12/2021 7:57:21 AM CST  Subject: General Message     I started a video visit with Brenda. She has Chron and contracted COVID19 in OCT despite being fully immunized. Her doctor had her stop the Humira. She has since restarted it but for the past week she has blood in her stool twice a day and feels dragged down and no energy, a little 'fuzzy' in the head.  No abdominal pain, no vomiting, she is a bit hesitant to eat. I asked her to come into clinic for eval---------------------  From: Tory Prater MD   To: Nidia Lozada;     Sent: 11/12/2021 7:59:10 AM CST  Subject: RE: General Message     Noted.  Thanks!

## 2022-02-16 NOTE — NURSING NOTE
Comprehensive Intake Entered On:  7/7/2020 10:17 AM CDT    Performed On:  7/7/2020 10:13 AM CDT by Viola Dong CMA               Summary   Chief Complaint :   Verbal consent given for video visit via cell phone. Concerns with polyuria and dysuria x 3 days. Has UTI hx.    Menstrual Status :   Menarcheal   [place] ac[place] CMA - 7/7/2020 10:13 AM CDT   Health Status   Allergies Verified? :   Yes   Medication History Verified? :   Yes   Pre-Visit Planning Status :   Not completed   Tobacco Use? :   Never smoker   [place]ola acuna CMA - 7/7/2020 10:13 AM CDT   Meds / Allergies   (As Of: 7/7/2020 10:17:41 AM CDT)   Allergies (Active)   Asacol  Estimated Onset Date:   Unspecified ; Reactions:   Diarrhea ; Created By:   Heidi Bowles LPN; Reaction Status:   Active ; Category:   Drug ; Substance:   Asacol ; Type:   Side Effect ; Updated By:   Heidi Bowles LPN; Source:   Patient ; Reviewed Date:   12/27/2019 3:43 PM CST        Medication List   (As Of: 7/7/2020 10:17:41 AM CDT)   Prescription/Discharge Order    ethinyl estradiol-etonogestrel  :   ethinyl estradiol-etonogestrel ; Status:   Prescribed ; Ordered As Mnemonic:   NuvaRing 0.120 mg-0.015 mg/24 hours vaginal ring ; Simple Display Line:   1 EA, Vaginal, q4 wks, 3 month supply sent. Need annual px for further refills., 1 EA, 3 Refill(s) ; Ordering Provider:   Nidia Lozada; Catalog Code:   ethinyl estradiol-etonogestrel ; Order Dt/Tm:   3/16/2020 3:30:50 PM CDT            Home Meds    adalimumab  :   adalimumab ; Status:   Documented ; Ordered As Mnemonic:   Humira 40 mg/0.8 mL subcutaneous kit ; Simple Display Line:   40 mg, 0.8 mL, subcutaneous, once, per Dr. Adry Vaca ; Catalog Code:   adalimumab ; Order Dt/Tm:   12/21/2012 4:10:05 PM CST            ID Risk Screen   Recent Travel History :   No recent travel   Family Member Travel History :   No recent travel   Other Exposure to Infectious Disease :   Unknown   Viola Dong CMA - 7/7/2020  10:13 AM CDT

## 2022-02-16 NOTE — TELEPHONE ENCOUNTER
---------------------  From: Michelle Vargas MA (eRx Pool ([id number]_[facility]))   To: [facility] Message Pool ([id number]);     Sent: 3/15/2020 1:47:18 PM CDT  Subject: Nuvaring refill     Fax from [facility] re: refill request for Nuvaring #3.  LV:  12/27/19 w/ [facility] for TMJ; 11/4/19 w/ [facility] for skin exam.  Med documented in chart, never has been refilled here.  Please advise.---------------------  From: Elda Padron LPN ([facility] Message Pool ([id number]))   To: Nidia Lozada;     Sent: 3/16/2020 2:46:45 PM CDT  Subject: FW: Nuvaring refill---------------------  From: Nidia Lozada   To: [facility] Message Pool ([id number]_[facility]);     Sent: 3/16/2020 3:12:16 PM CDT  Subject: RE: Nuvaring refill     ok to fill fo 3 months only, let he know she needs her annual examSent per [facility]. Note added to Rx that pt needs annual px for further refills.

## 2022-02-16 NOTE — NURSING NOTE
Depression Screening Entered On:  12/28/2020 3:13 PM CST    Performed On:  12/16/2020 3:13 PM CST by Arlene Alexis               Depression Screening   Little Interest - Pleasure in Activities :   Not at all   Feeling Down, Depressed, Hopeless :   Several days   Initial Depression Screen Score :   1 Score   Poor Appetite or Overeating :   Not at all   Trouble Falling or Staying Asleep :   Several days   Feeling Tired or Little Energy :   Not at all   Feeling Bad About Yourself :   Not at all   Trouble Concentrating :   Not at all   Moving or Speaking Slowly :   Not at all   Thoughts Better Off Dead or Hurting Self :   Not at all   CONTRERAS Difficulty with Work, Home, Others :   Not difficult at all   Detailed Depression Screen Score :   1    Total Depression Screen Score :   2    Arlene Alexis - 12/28/2020 3:13 PM CST

## 2022-02-16 NOTE — NURSING NOTE
Comprehensive Intake Entered On:  12/16/2020 4:12 PM CST    Performed On:  12/16/2020 4:11 PM CST by Lexi Estrada               Summary   Chief Complaint :   Px   Menstrual Status :   Menarcheal   Weight Measured :   147.5 lb(Converted to: 147 lb 8 oz, 66.905 kg)    Height Measured :   68.25 in(Converted to: 5 ft 8 in, 173.35 cm)    Body Mass Index :   22.26 kg/m2   Body Surface Area :   1.79 m2   Systolic Blood Pressure :   114 mmHg   Diastolic Blood Pressure :   76 mmHg   Mean Arterial Pressure :   89 mmHg   Peripheral Pulse Rate :   88 bpm   BP Method :   Electronic   HR Method :   Electronic   Temperature Tympanic :   97.4 DegF(Converted to: 36.3 DegC)  (LOW)    Lexi Estrada - 12/16/2020 4:11 PM CST   Health Status   Allergies Verified? :   Yes   Medication History Verified? :   Yes   Pre-Visit Planning Status :   Completed   Tobacco Use? :   Never smoker   Lexi Estrada - 12/16/2020 4:11 PM CST   ID Risk Screen   Recent Travel History :   No recent travel   Family Member Travel History :   No recent travel   Other Exposure to Infectious Disease :   Unknown   Lexi Estrada - 12/16/2020 4:11 PM CST   Social History   Social History   (As Of: 12/16/2020 4:12:45 PM CST)   Alcohol:  Current      Current, 1-2 times per week   (Last Updated: 7/30/2012 10:13:59 AM CDT by Maya Smith CMA)          Tobacco:        Never (less than 100 in lifetime)   (Last Updated: 12/16/2020 4:11:32 PM CST by Lexi Estrada)          Electronic Cigarette/Vaping:        Electronic Cigarette Use: Never.   (Last Updated: 12/16/2020 4:11:35 PM CST by Lexi Estrada)          Substance Abuse:  Denies Substance Abuse      (Last Updated: 11/22/2011 9:11:36 PM CST by Umer Hayes MD )         Employment/School:        Student   Comments:  11/22/2011 9:12 PM - Umer Hayes MD: [employer]   (Last Updated: 11/22/2011 9:12:06 PM CST by Umer Hayes MD)          Exercise:  Does not exercise      (Last Updated: 7/30/2012  10:14:18 AM CDT by Maya Smith CMA )         Sexual:  No Sexual Activity      (Last Updated: 7/30/2012 10:17:02 AM CDT by Maya Smith CMA )

## 2022-03-02 VITALS
TEMPERATURE: 98.7 F | OXYGEN SATURATION: 98 % | HEART RATE: 81 BPM | DIASTOLIC BLOOD PRESSURE: 70 MMHG | HEIGHT: 68 IN | BODY MASS INDEX: 22.49 KG/M2 | SYSTOLIC BLOOD PRESSURE: 122 MMHG

## 2022-03-02 NOTE — PROGRESS NOTES
Patient:   MADHU SPANGLER            MRN: [id number]            FIN: [id number]               Age:   27 years     Sex:  Female     :  1995   Associated Diagnoses:   Abnormal facial hair   Author:   Nidia Lozada      Visit Information      Date of Service: 2022 05:03 pm  Performing Location: [facility]  Encounter#: [id number]      Primary Care Provider (PCP):  Zac Correa MD    NPI# [id number]      Referring Provider:  Nidia Lozada    NPI# [id number]      Chief Complaint   2022 5:16 PM CST    questions about her IUD - Kyleena, has noticed more facial hair since placement 2021, wondering if this could be a side effect        History of Present Illness   Madhu has had Kyleena IUD in place for 1 year, happy with reliable contraception and rare spotting. Prior to this she used Nuvaring  A few months ago she noted increased facial hair, boyfriend and roommate also commented. She is not sure this is a big deal, almost cancelled appointment. Has not shaved or plucked or bleached hair, it is on the jaw line up to her ear lobes.    he is on Humira, but has been for years      Health Status   Allergies:    Nonallergic Reactions (Selected)  Severity Not Documented  Asacol (Diarrhea)   Medications:  (Selected)   Prescriptions  Prescribed  Analpram-HC 2.5%-1% rectal cream: 1 [place], [place], TID, Instructions: apply a thin film to nicki rectal area, # 4 gm, 0 Refill(s), Type: Maintenance, Pharmacy: [facility], 1 alex[place] tid,x14 day(s),Instr:apply a thin film to nicki rectal area, 68.25, in, 21 7:36:00 CST, Height Measured,...  Kyleena 19.5 mg intrauterine device: = 1 EA ( 19.5 mg ), Intrauteral, once, # 1 EA, 0 Refill(s), Type: Soft Stop, other reason (Rx)  Documented Medications  Documented  Humira 40 mg/0.8 mL subcutaneous kit: 0.8 mL ( 40 mg ), subcutaneous, once, Instructions: per Dr. Adry Vaca, 0 Refill(s), Type: Maintenance,    Medications          *denotes  recorded medication          *Humira 40 mg/0.8 mL subcutaneous kit: 40 mg, 0.8 mL, subcutaneous, once, per Dr. Adry Vaca.          Analpram-HC 2.5%-1% rectal cream: 1 [place], [place], TID, for 14 day(s), apply a thin film to nicki rectal area, 4 gm, 0 Refill(s).          Kyleena 19.5 mg intrauterine device: 19.5 mg, 1 EA, Intrauteral, once, 1 EA, 0 Refill(s).       Problem list:    All Problems  Duodenitis / ICD-9-.60 / Confirmed  Crohn disease / SNOMED CT 57934614 / Confirmed  ASCUS with positive high risk HPV cervical / SNOMED CT 308898726 / Confirmed  referred for colposcopy  ASCUS wtih  HR HPV detected, repeat colposcopy advised  Resolved: Otitis Media / ICD-9-.9  Canceled: Crohns Disease / ICD-9-.9      Histories   Past Medical History:    Active  Crohn disease (06710740)  Resolved  Otitis Media (382.9): Onset in 2009 at 13 years.  Resolved.   Family History:    Heart  Sister  Hypothyroid  Mother  Diabetes mellitus  Sister  Asthma  Mother  Mental health disorder  Father  Alzheimer's disease  Grandmother (P)  Kidney Disease  Father     Procedure history:    Insertion of IUD (SNOMED CT 388316390) performed by Cecil Benton MD on 1/6/2021 at 25 Years.  Comments:  1/6/2021 10:39 AM ADELA - Iker AGUAYOViola  Kyleena IUD  Lot [id number]  Exp 2/2022  Esophagogastroduodenoscopy (SNOMED CT 213997932) performed by Ronald Ga MD on 12/5/2011 at 16 Years.  Colonoscopy (SNOMED CT 093391714) on 12/5/2011 at 16 Years.  None (SNOMED CT 170245907).  History of hemicolectomy (SNOMED CT 9178395475).   Social History:        Electronic Cigarette/Vaping Assessment            Electronic Cigarette Use: Never.      Alcohol Assessment            Current, Beer (12 oz), Wine (5 oz), 1-2 times per week, 1 drinks/episode average.  2 drinks/episode maximum.               Ready to change: No.      Tobacco Assessment            Never (less than 100 in lifetime)      Substance Abuse Assessment            Current, Marijuana, 1-2  times per year, Ready to change: No.      Home and Environment Assessment            Marital status: Single.  Living situation: Home/Independent.  Injuries/Abuse/Neglect in household: No.  Feels               unsafe at home: No.  Family/Friends available for support: Yes.      Nutrition and Health Assessment            Type of diet: Regular.  Wants to lose weight: No.  Sleeping concerns: No.  Feels highly stressed: No.      Exercise and Physical Activity Assessment            Exercise frequency: 1-2 times/week.  Exercise type: Yoga.      Sexual Assessment            Sexually active: Yes.  Identifies as female, Sexual orientation: Straight or heterosexual.  History of STD:               No.  Contraceptive Use Details: Vaginal ring.  History of sexual abuse: No.        Physical Examination   VS/Measurements   General:  Alert and oriented, No acute distress, removed mask, there is soft blond short hair on cheeks/jaw line. No course or black hair.       Impression and Plan   Diagnosis     Abnormal facial hair (PLU80-SR L67.8).     Plan:  uncleara if Kyleena is causing this, it is very subtle, but it is concerning to her options would include  1. keep Kyleena and see derm to talk about treatments  2. Keep Kyleena and add low dose ocp to add some estrogen and reduce side effects  3. remove Kyleena and expect resolution, consider other contraception with estrogen    She will consider these things and let me know if needs referral.    Patient Instructions:       Counseled: Patient, Regarding diagnosis, Regarding treatment, Regarding medications, Verbalized understanding, Counseled on symptomatic management. Return to clinic for re evaluation if worsening, simply not improving, or failure to resolve.   .

## 2022-03-23 ENCOUNTER — NURSE TRIAGE (OUTPATIENT)
Dept: FAMILY MEDICINE | Facility: CLINIC | Age: 27
End: 2022-03-23

## 2022-03-23 NOTE — TELEPHONE ENCOUNTER
"  Reason for Disposition    [1] Adult with possible COVID-19 symptoms AND [2] triager concerned about severity of symptoms or other causes    [1] COVID-19 diagnosed by positive lab test (e.g., PCR, rapid self-test kit) AND [2] NO symptoms (e.g., cough, fever, others)    Additional Information    Negative: SEVERE difficulty breathing (e.g., struggling for each breath, speaks in single words)    Negative: Difficult to awaken or acting confused (e.g., disoriented, slurred speech)    Negative: Bluish (or gray) lips or face now    Negative: Shock suspected (e.g., cold/pale/clammy skin, too weak to stand, low BP, rapid pulse)    Negative: Sounds like a life-threatening emergency to the triager    Negative: [1] Diagnosed or suspected COVID-19 AND [2] symptoms lasting 3 or more weeks    Negative: [1] COVID-19 exposure AND [2] no symptoms    Negative: COVID-19 vaccine reaction suspected (e.g., fever, headache, muscle aches) occurring 1 to 3 days after getting vaccine    Negative: COVID-19 vaccine, questions about    Negative: [1] Lives with someone known to have influenza (flu test positive) AND [2] flu-like symptoms (e.g., cough, runny nose, sore throat, SOB; with or without fever)    Negative: COVID-19 and breastfeeding, questions about    Negative: SEVERE or constant chest pain or pressure  (Exception: Mild central chest pain, present only when coughing.)    Negative: MODERATE difficulty breathing (e.g., speaks in phrases, SOB even at rest, pulse 100-120)    Negative: Headache and stiff neck (can't touch chin to chest)    Negative: Oxygen level (e.g., pulse oximetry) 90 percent or lower    Negative: Chest pain or pressure    Negative: Patient sounds very sick or weak to the triager    Answer Assessment - Initial Assessment Questions  1. COVID-19 DIAGNOSIS: \"Who made your COVID-19 diagnosis?\" \"Was it confirmed by a positive lab test or self-test?\" If not diagnosed by a doctor (or NP/PA), ask \"Are there lots of cases " "(community spread) where you live?\" Note: See public health department website, if unsure.      Home test  2. COVID-19 EXPOSURE: \"Was there any known exposure to COVID before the symptoms began?\" CDC Definition of close contact: within 6 feet (2 meters) for a total of 15 minutes or more over a 24-hour period.       Not known  3. ONSET: \"When did the COVID-19 symptoms start?\"       3/21/22  4. WORST SYMPTOM: \"What is your worst symptom?\" (e.g., cough, fever, shortness of breath, muscle aches)      Chest pressure  5. COUGH: \"Do you have a cough?\" If Yes, ask: \"How bad is the cough?\"        Productive, not too bad  6. FEVER: \"Do you have a fever?\" If Yes, ask: \"What is your temperature, how was it measured, and when did it start?\"      no  7. RESPIRATORY STATUS: \"Describe your breathing?\" (e.g., shortness of breath, wheezing, unable to speak)       Labored   8. BETTER-SAME-WORSE: \"Are you getting better, staying the same or getting worse compared to yesterday?\"  If getting worse, ask, \"In what way?\"      Same  9. HIGH RISK DISEASE: \"Do you have any chronic medical problems?\" (e.g., asthma, heart or lung disease, weak immune system, obesity, etc.)      Crohn's  10. VACCINE: \"Have you had the COVID-19 vaccine?\" If Yes, ask: \"Which one, how many shots, when did you get it?\"        yes  11. BOOSTER: \"Have you received your COVID-19 booster?\" If Yes, ask: \"Which one and when did you get it?\"        no  12. PREGNANCY: \"Is there any chance you are pregnant?\" \"When was your last menstrual period?\"        no  13. OTHER SYMPTOMS: \"Do you have any other symptoms?\"  (e.g., chills, fatigue, headache, loss of smell or taste, muscle pain, sore throat)        Fatigue, headache, sore throat  14. O2 SATURATION MONITOR:  \"Do you use an oxygen saturation monitor (pulse oximeter) at home?\" If Yes, ask \"What is your reading (oxygen level) today?\" \"What is your usual oxygen saturation reading?\" (e.g., 95%)        na    Protocols used: " CORONAVIRUS (COVID-19) DIAGNOSED OR [id number] 1.18.2022

## 2022-04-21 ENCOUNTER — NURSE TRIAGE (OUTPATIENT)
Dept: FAMILY MEDICINE | Facility: CLINIC | Age: 27
End: 2022-04-21

## 2022-04-21 NOTE — TELEPHONE ENCOUNTER
Tc received from pt, inquiring about her bill. Stated she made a payment and received another bill in the mail. Message sent to Kristie Mon in billing.

## 2022-05-09 ENCOUNTER — OFFICE VISIT (OUTPATIENT)
Dept: FAMILY MEDICINE | Facility: CLINIC | Age: 27
End: 2022-05-09
Payer: COMMERCIAL

## 2022-05-09 VITALS
BODY MASS INDEX: 22.34 KG/M2 | HEART RATE: 97 BPM | DIASTOLIC BLOOD PRESSURE: 60 MMHG | TEMPERATURE: 98.8 F | WEIGHT: 148 LBS | OXYGEN SATURATION: 98 % | SYSTOLIC BLOOD PRESSURE: 110 MMHG

## 2022-05-09 DIAGNOSIS — Z30.431 IUD CHECK UP: Primary | ICD-10-CM

## 2022-05-09 PROBLEM — K21.9 GERD (GASTROESOPHAGEAL REFLUX DISEASE): Status: ACTIVE | Noted: 2022-03-15

## 2022-05-09 PROBLEM — N87.9 CERVICAL ATYPIA: Status: ACTIVE | Noted: 2021-01-04

## 2022-05-09 PROBLEM — K50.90 CROHN'S DISEASE (H): Status: ACTIVE | Noted: 2022-05-09

## 2022-05-09 PROCEDURE — 99213 OFFICE O/P EST LOW 20 MIN: CPT | Performed by: NURSE PRACTITIONER

## 2022-05-09 RX ORDER — ADALIMUMAB 40MG/0.4ML
KIT SUBCUTANEOUS
COMMUNITY
Start: 2021-11-11

## 2022-05-09 RX ORDER — LEVONORGESTREL 19.5 MG/1
19.5 INTRAUTERINE DEVICE INTRAUTERINE ONCE
COMMUNITY
Start: 2021-01-06

## 2022-05-09 NOTE — PROGRESS NOTES
Assessment & Plan     IUD check up  Reassured IUD likely in place  Needs pelvic US of pain or cramping    Nidia Reyna NP  [facility]    Edith Gutierrez is a 27 year old who presents for the following health issues: here for IUD check, can't feel the sting except for just a bit. Has had KYLEENA iud about 1 year, some occ menses or spotting, no cramping or pain, no signs of infection, same partner    History of Present Illness       Reason for visit:  UTI follow up check  Symptoms include:  NA    She eats 2-3 servings of fruits and vegetables daily.She consumes 1 sweetened beverage(s) daily.She exercises with enough effort to increase her heart rate 30 to 60 minutes per day.  She exercises with enough effort to increase her heart rate 3 or less days per week.   She is taking medications regularly.             Review of Systems         Objective    /60 (BP Location: Right arm, Patient Position: Sitting)   Pulse 97   Temp 98.8  F (37.1  C)   Wt 67.1 kg (148 lb)   SpO2 98%   BMI 22.34 kg/m    Body mass index is 22.34 kg/m .  Physical Exam   Pelvic exam reveals tip of blue iud string visible as os, able to clearly see with use of cytobrush which caused just bit of spotting

## 2022-05-13 ENCOUNTER — OFFICE VISIT (OUTPATIENT)
Dept: FAMILY MEDICINE | Facility: CLINIC | Age: 27
End: 2022-05-13
Payer: COMMERCIAL

## 2022-05-13 VITALS
TEMPERATURE: 98.4 F | HEART RATE: 73 BPM | DIASTOLIC BLOOD PRESSURE: 58 MMHG | SYSTOLIC BLOOD PRESSURE: 102 MMHG | OXYGEN SATURATION: 99 %

## 2022-05-13 DIAGNOSIS — H10.33 ACUTE CONJUNCTIVITIS OF BOTH EYES, UNSPECIFIED ACUTE CONJUNCTIVITIS TYPE: Primary | ICD-10-CM

## 2022-05-13 PROCEDURE — 99213 OFFICE O/P EST LOW 20 MIN: CPT | Performed by: NURSE PRACTITIONER

## 2022-05-13 RX ORDER — POLYMYXIN B SULFATE AND TRIMETHOPRIM 1; 10000 MG/ML; [USP'U]/ML
1 SOLUTION OPHTHALMIC 3 TIMES DAILY
Qty: 10 ML | Refills: 0 | Status: SHIPPED | OUTPATIENT
Start: 2022-05-13 | End: 2022-07-11

## 2022-05-31 DIAGNOSIS — K50.90 CROHN DISEASE (H): Primary | ICD-10-CM

## 2022-06-03 ENCOUNTER — LAB (OUTPATIENT)
Dept: LAB | Facility: CLINIC | Age: 27
End: 2022-06-03
Payer: COMMERCIAL

## 2022-06-03 DIAGNOSIS — K50.90 CROHN DISEASE (H): ICD-10-CM

## 2022-06-03 PROCEDURE — 87506 IADNA-DNA/RNA PROBE TQ 6-11: CPT

## 2022-06-03 PROCEDURE — 83993 ASSAY FOR CALPROTECTIN FECAL: CPT

## 2022-06-03 PROCEDURE — 87493 C DIFF AMPLIFIED PROBE: CPT | Mod: 59

## 2022-06-04 ENCOUNTER — APPOINTMENT (OUTPATIENT)
Dept: LAB | Facility: CLINIC | Age: 27
End: 2022-06-04
Payer: COMMERCIAL

## 2022-06-04 LAB
C COLI+JEJUNI+LARI FUSA STL QL NAA+PROBE: NOT DETECTED
C DIFF TOX B STL QL: NEGATIVE
EC STX1 GENE STL QL NAA+PROBE: NOT DETECTED
EC STX2 GENE STL QL NAA+PROBE: NOT DETECTED
NOROV GI+II ORF1-ORF2 JNC STL QL NAA+PR: NOT DETECTED
RVA NSP5 STL QL NAA+PROBE: NOT DETECTED
SALMONELLA SP RPOD STL QL NAA+PROBE: NOT DETECTED
SHIGELLA SP+EIEC IPAH STL QL NAA+PROBE: NOT DETECTED
V CHOL+PARA RFBL+TRKH+TNAA STL QL NAA+PR: NOT DETECTED
Y ENTERO RECN STL QL NAA+PROBE: NOT DETECTED

## 2022-06-06 DIAGNOSIS — K50.90 CROHN DISEASE (H): Primary | ICD-10-CM

## 2022-06-06 LAB — CALPROTECTIN STL-MCNT: 554 MG/KG (ref 0–49.9)

## 2022-06-10 ENCOUNTER — LAB (OUTPATIENT)
Dept: LAB | Facility: CLINIC | Age: 27
End: 2022-06-10
Payer: COMMERCIAL

## 2022-06-10 DIAGNOSIS — Z20.822 ENCOUNTER FOR LABORATORY TESTING FOR COVID-19 VIRUS: ICD-10-CM

## 2022-06-10 DIAGNOSIS — K50.90 CROHN DISEASE (H): ICD-10-CM

## 2022-06-10 LAB — SARS-COV-2 RNA RESP QL NAA+PROBE: NEGATIVE

## 2022-06-10 PROCEDURE — 36415 COLL VENOUS BLD VENIPUNCTURE: CPT

## 2022-06-10 PROCEDURE — U0005 INFEC AGEN DETEC AMPLI PROBE: HCPCS

## 2022-06-10 PROCEDURE — 82397 CHEMILUMINESCENT ASSAY: CPT | Mod: 90

## 2022-06-10 PROCEDURE — 99000 SPECIMEN HANDLING OFFICE-LAB: CPT

## 2022-06-10 PROCEDURE — U0003 INFECTIOUS AGENT DETECTION BY NUCLEIC ACID (DNA OR RNA); SEVERE ACUTE RESPIRATORY SYNDROME CORONAVIRUS 2 (SARS-COV-2) (CORONAVIRUS DISEASE [COVID-19]), AMPLIFIED PROBE TECHNIQUE, MAKING USE OF HIGH THROUGHPUT TECHNOLOGIES AS DESCRIBED BY CMS-2020-01-R: HCPCS

## 2022-06-10 PROCEDURE — 80145 DRUG ASSAY ADALIMUMAB: CPT | Mod: 90

## 2022-06-12 LAB
ADALIMUMAB NAB TITR SER BIOASSAY: NOT DETECTED {TITER}
ADALIMUMAB SERPL-MCNC: 5.2 UG/ML
PATHOLOGY STUDY: NORMAL

## 2022-07-10 DIAGNOSIS — H10.33 ACUTE CONJUNCTIVITIS OF BOTH EYES, UNSPECIFIED ACUTE CONJUNCTIVITIS TYPE: ICD-10-CM

## 2022-07-11 RX ORDER — POLYMYXIN B SULFATE AND TRIMETHOPRIM 1; 10000 MG/ML; [USP'U]/ML
SOLUTION OPHTHALMIC
Qty: 10 ML | Refills: 0 | Status: SHIPPED | OUTPATIENT
Start: 2022-07-11 | End: 2022-08-15

## 2022-07-11 NOTE — TELEPHONE ENCOUNTER
Routing refill request to provider for review/approval because:  Drug not on the [facility] refill protocol     Rx: 5/13/22  #10ML  R-0      Visit: 5/13/22

## 2022-07-24 ENCOUNTER — HEALTH MAINTENANCE LETTER (OUTPATIENT)
Age: 27
End: 2022-07-24

## 2022-08-15 ENCOUNTER — OFFICE VISIT (OUTPATIENT)
Dept: FAMILY MEDICINE | Facility: CLINIC | Age: 27
End: 2022-08-15
Payer: COMMERCIAL

## 2022-08-15 VITALS
DIASTOLIC BLOOD PRESSURE: 60 MMHG | SYSTOLIC BLOOD PRESSURE: 116 MMHG | HEIGHT: 68 IN | BODY MASS INDEX: 22.43 KG/M2 | WEIGHT: 148 LBS | TEMPERATURE: 98 F | HEART RATE: 78 BPM

## 2022-08-15 DIAGNOSIS — N89.8 VAGINAL IRRITATION: ICD-10-CM

## 2022-08-15 DIAGNOSIS — B96.89 BACTERIAL VAGINOSIS: Primary | ICD-10-CM

## 2022-08-15 DIAGNOSIS — N76.0 BACTERIAL VAGINOSIS: Primary | ICD-10-CM

## 2022-08-15 LAB
CLUE CELLS: ABNORMAL
TRICHOMONAS, WET PREP: ABNORMAL
WBC'S/HIGH POWER FIELD, WET PREP: ABNORMAL
YEAST, WET PREP: ABNORMAL

## 2022-08-15 PROCEDURE — 87210 SMEAR WET MOUNT SALINE/INK: CPT | Mod: QW | Performed by: FAMILY MEDICINE

## 2022-08-15 PROCEDURE — 99213 OFFICE O/P EST LOW 20 MIN: CPT | Performed by: FAMILY MEDICINE

## 2022-08-15 RX ORDER — METRONIDAZOLE 7.5 MG/G
1 GEL VAGINAL DAILY
Qty: 25 G | Refills: 0 | Status: SHIPPED | OUTPATIENT
Start: 2022-08-15 | End: 2022-08-20

## 2022-08-15 RX ORDER — BUDESONIDE 3 MG/1
3 CAPSULE, COATED PELLETS ORAL EVERY MORNING
COMMUNITY
End: 2022-10-17

## 2022-08-15 NOTE — PROGRESS NOTES
Assessment & Plan   Problem List Items Addressed This Visit    None     Visit Diagnoses     Bacterial vaginosis    -  Primary    Relevant Medications    metroNIDAZOLE (METROGEL) 0.75 % vaginal gel    Vaginal irritation        Relevant Orders    Wet prep - Clinic Collect (Completed)         Even though her wet prep came back negative patient had a very good clinical story for bacterial vaginosis and we elected to treat this topically.  MetroGel nightly for 5 nights  Follow-up if not improving as anticipated  Patient verbalizes understanding               No follow-ups on file.    Tory Prater MD  [facility]    Edith Gutierrez is a 27 year old accompanied by her, presenting for the following health issues:  Vaginal Problem (Yeast infection? C/o pain and itching that is worse after intercourse. No white discharge. )    Patient complains of an itchy irritated vaginal area.  She is having pain especially after intercourse.  She also is noticing vaginal odor which has a fishy smell.  She is not having vaginal discharge.  No dysuria, hematuria, urinary urgency or frequency  She does have a history of UTIs  She took an over-the-counter 1 day yeast treatment which included a vaginal tablet as well as cream.  This did not help.  She has no concern about sexually transmitted infections.  This has been going on for 2 weeks.    Vaginal Problem     History of Present Illness       Reason for visit:  Possible yeast infection  Symptom onset:  1-2 weeks ago  Symptoms include:  Painful intercourse, itchiness and irritation, fishy odor  Symptom intensity:  Mild  Symptom progression:  Staying the same  Had these symptoms before:  No  What makes it worse:  Sex  What makes it better:  Anti itch cream helps a little    She eats 2-3 servings of fruits and vegetables daily.She consumes 1 sweetened beverage(s) daily.She exercises with enough effort to increase her heart rate 20 to 29 minutes per day.   "She exercises with enough effort to increase her heart rate 3 or less days per week.   She is taking medications regularly.             Review of Systems   Genitourinary: Positive for vaginal discharge.   Negative except as listed in HPI        Objective    /60 (BP Location: Right arm, Patient Position: Sitting, Cuff Size: Adult Large)   Pulse 78   Temp 98  F (36.7  C)   Ht 1.734 m (5' 8.25\")   Wt 67.1 kg (148 lb)   BMI 22.34 kg/m    Body mass index is 22.34 kg/m .  Physical Exam   Vitals noted and within normal limits  In general she is alert, oriented, and in no acute distress  Normal external female genitalia with no erythema or rash  Wet prep obtained  Wet prep within normal limits  Results for orders placed or performed in visit on 08/15/22   Wet prep - Clinic Collect     Status: Abnormal    Specimen: Vagina; Swab   Result Value Ref Range    Trichomonas Absent Absent    Yeast Absent Absent    Clue Cells Absent Absent    WBCs/high power field 2+ (A) None                           .  ..  "

## 2022-09-14 NOTE — TELEPHONE ENCOUNTER
Reason for call:  Patient reporting a symptom    Symptom or request: COVID    Duration (how long have symptoms been present): Patient tested positive for COVID 3.22.22 for second. Symptoms of congestion, dry cough, sore throat, fatigue, HA, Dizziness and Diarrhea    Have you been treated for this before? Yes    Additional comments: Would like to discuss symptoms    Phone Number patient can be reached at:  Cell number on file:    No relevant phone numbers on file.       Best Time:  anytime    Can we leave a detailed message on this number:  YES    Call taken on 3/23/2022 at 12:21 PM by Paty Pryor   Spoke with [facility].    They needed authorization from PCP to fill early.     Advised PCP was ok to fill related to patient is going out of town.     Verbalized understanding and all questions answered.

## 2022-10-03 ENCOUNTER — HEALTH MAINTENANCE LETTER (OUTPATIENT)
Age: 27
End: 2022-10-03

## 2022-10-17 ENCOUNTER — OFFICE VISIT (OUTPATIENT)
Dept: FAMILY MEDICINE | Facility: CLINIC | Age: 27
End: 2022-10-17
Payer: COMMERCIAL

## 2022-10-17 VITALS
BODY MASS INDEX: 23.24 KG/M2 | SYSTOLIC BLOOD PRESSURE: 126 MMHG | DIASTOLIC BLOOD PRESSURE: 64 MMHG | WEIGHT: 154 LBS | HEART RATE: 64 BPM

## 2022-10-17 DIAGNOSIS — N63.10 MASS OF RIGHT BREAST, UNSPECIFIED QUADRANT: Primary | ICD-10-CM

## 2022-10-17 PROCEDURE — 99213 OFFICE O/P EST LOW 20 MIN: CPT | Performed by: PHYSICIAN ASSISTANT

## 2022-10-17 ASSESSMENT — ENCOUNTER SYMPTOMS
BREAST MASS: 1
CONSTITUTIONAL NEGATIVE: 1

## 2022-10-17 NOTE — PROGRESS NOTES
A/P Right breast lump. Up to Date recommends US and Mammo as indicated. Will order and Follow-up with results.    Edith Gutierrez is a 27 year old, presenting for the following health issues:  No chief complaint on file.      27-year-old female presents to clinic with complaint of breast lump.  Has noticed that over the past couple of weeks.  She is on a Kyleena IUD the first noticed of the slump would have been midcycle for her she does do some light spotting every month and she is just started that now she has not any fever no chills she has not noted any erythema she has had no discharge she has not had history of breast lump previously    History of Present Illness       Reason for visit:  Hard lump in breast  Symptom onset:  1-2 weeks ago  Symptoms include:  Difference in breast, slight pain  Symptom intensity:  Mild  Symptom progression:  Staying the same  Had these symptoms before:  No  What makes it worse:  No  What makes it better:  No    She eats 2-3 servings of fruits and vegetables daily.She consumes 1 sweetened beverage(s) daily.She exercises with enough effort to increase her heart rate 30 to 60 minutes per day.  She exercises with enough effort to increase her heart rate 3 or less days per week.   She is taking medications regularly.             Review of Systems   Constitutional: Negative.    Breasts:  Positive for tenderness and breast mass. Negative for discharge.            Objective    There were no vitals taken for this visit.  There is no height or weight on file to calculate BMI.  Physical Exam  Vitals and nursing note reviewed. Exam conducted with a chaperone present.     Left breast prominence medially seems to straddle the upper and inferior quadrant there is no erythema no other mass noted there is no discharge no axillary or supraclavicular adenopathy noted

## 2022-10-19 ENCOUNTER — TELEPHONE (OUTPATIENT)
Dept: FAMILY MEDICINE | Facility: CLINIC | Age: 27
End: 2022-10-19

## 2023-01-27 DIAGNOSIS — K50.90 CROHN DISEASE (H): Primary | ICD-10-CM

## 2023-01-30 DIAGNOSIS — K50.90 CROHN DISEASE (H): Primary | ICD-10-CM

## 2023-02-22 ENCOUNTER — OFFICE VISIT (OUTPATIENT)
Dept: FAMILY MEDICINE | Facility: CLINIC | Age: 28
End: 2023-02-22
Payer: COMMERCIAL

## 2023-02-22 VITALS
SYSTOLIC BLOOD PRESSURE: 114 MMHG | OXYGEN SATURATION: 99 % | TEMPERATURE: 98.3 F | HEIGHT: 68 IN | BODY MASS INDEX: 22.73 KG/M2 | HEART RATE: 80 BPM | RESPIRATION RATE: 20 BRPM | WEIGHT: 150 LBS | DIASTOLIC BLOOD PRESSURE: 72 MMHG

## 2023-02-22 DIAGNOSIS — R05.9 COUGH, UNSPECIFIED TYPE: ICD-10-CM

## 2023-02-22 DIAGNOSIS — J02.9 PHARYNGITIS, UNSPECIFIED ETIOLOGY: Primary | ICD-10-CM

## 2023-02-22 PROBLEM — U07.1 COVID-19: Status: ACTIVE | Noted: 2021-10-07

## 2023-02-22 LAB
DEPRECATED S PYO AG THROAT QL EIA: NEGATIVE
FLUAV AG SPEC QL IA: NEGATIVE
FLUBV AG SPEC QL IA: NEGATIVE
GROUP A STREP BY PCR: NOT DETECTED
SARS-COV-2 RNA RESP QL NAA+PROBE: NEGATIVE

## 2023-02-22 PROCEDURE — 87804 INFLUENZA ASSAY W/OPTIC: CPT | Mod: 59 | Performed by: PHYSICIAN ASSISTANT

## 2023-02-22 PROCEDURE — 87804 INFLUENZA ASSAY W/OPTIC: CPT | Mod: QW | Performed by: PHYSICIAN ASSISTANT

## 2023-02-22 PROCEDURE — 99213 OFFICE O/P EST LOW 20 MIN: CPT | Mod: CS | Performed by: PHYSICIAN ASSISTANT

## 2023-02-22 PROCEDURE — U0003 INFECTIOUS AGENT DETECTION BY NUCLEIC ACID (DNA OR RNA); SEVERE ACUTE RESPIRATORY SYNDROME CORONAVIRUS 2 (SARS-COV-2) (CORONAVIRUS DISEASE [COVID-19]), AMPLIFIED PROBE TECHNIQUE, MAKING USE OF HIGH THROUGHPUT TECHNOLOGIES AS DESCRIBED BY CMS-2020-01-R: HCPCS | Performed by: PHYSICIAN ASSISTANT

## 2023-02-22 PROCEDURE — 87651 STREP A DNA AMP PROBE: CPT | Performed by: PHYSICIAN ASSISTANT

## 2023-02-22 PROCEDURE — U0005 INFEC AGEN DETEC AMPLI PROBE: HCPCS | Performed by: PHYSICIAN ASSISTANT

## 2023-02-22 RX ORDER — PREDNISONE 10 MG/1
TABLET ORAL
COMMUNITY
Start: 2023-01-27 | End: 2023-03-17

## 2023-02-22 ASSESSMENT — ENCOUNTER SYMPTOMS: COUGH: 1

## 2023-02-22 NOTE — PATIENT INSTRUCTIONS
Watch for white patches and plaques in the throat, lips, tongue (due to prednisone) if that occurs should be rechecked or call, oral thrush can cause a bad sore throat as well.

## 2023-02-22 NOTE — PROGRESS NOTES
Assessment & Plan     Pharyngitis  Reassured of negative RST.   Push fluids, rest and ibuprofen or tylenol for comfort.    RTC for persistent or worsening sx.   Watch for white patches/plaques, sign of oral thrush which may be secondary to prednisone.    - Streptococcus A Rapid Screen w/Reflex to PCR - Clinic Collect  - Symptomatic COVID-19 Virus (Coronavirus) by PCR Nose  - Influenza A & B Antigen - Clinic Collect  - Group A Streptococcus PCR Throat Swab    Cough  Await covid 19 test   If positive she should have virtual visit for Paxlovid / oral antiviral tx options.    - Symptomatic COVID-19 Virus (Coronavirus) by PCR Nose  - Influenza A & B Antigen - Clinic Collect       Vivian Pool PA-C  [facility]    Edith Gutierrez is a 28 year old female who presents to clinic today for the following health issues:  Chief Complaint   Patient presents with     Cough     Pt c/o dry cough,sore throat,body aches and chills x 2-3 days     Cough    History of Present Illness       Reason for visit:  Possible strep or covid  Symptom onset:  1-3 days ago  Symptoms include:  Sore throat,dry cough,body aches  Symptom intensity:  Mild  Symptom progression:  Worsening  Had these symptoms before:  Yes  Has tried/received treatment for these symptoms:  Yes  Previous treatment was successful:  Yes  Prior treatment description:  Antibiotics for strep  What makes it worse:  No  What makes it better:  Rest    She eats 0-1 servings of fruits and vegetables daily.She consumes 1 sweetened beverage(s) daily.She exercises with enough effort to increase her heart rate 30 to 60 minutes per day.  She exercises with enough effort to increase her heart rate 4 days per week.   She is taking medications regularly.    Patient is a 28-year-old female with history of immune suppression secondary to Humira for Crohn's disease.  She currently is on prednisone taper as well for acute flare.  She has had a 2-day history  "of pharyngitis.  She denies any fevers or chills.  She has a mild cough.  No shortness of breath or difficulty breathing. Does note laryngitis/hoarsenss today.  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  She has had previous oral lesions with flare of her Crohn's disease but has not noted that currently.    Review of Systems   Respiratory: Positive for cough.      Constitutional, HEENT, cardiovascular, pulmonary, gi and gu systems are negative, except as otherwise noted.      Objective    /72 (BP Location: Right arm, Patient Position: Sitting, Cuff Size: Adult Regular)   Pulse 80   Temp 98.3  F (36.8  C) (Tympanic)   Resp 20   Ht 1.734 m (5' 8.25\")   Wt 68 kg (150 lb)   SpO2 99%   BMI 22.64 kg/m    Physical Exam   Pt is in no acute distress and appears well  Ears patent B:  TM s intact, non-injected. All land marks easily visibile    Nasal mucosa is non-edematous, no discharge.    Pharynx: mildly erythematous, tonsils non hypertrophied, No exudate.  No white patches, plaques    Neck supple: no adenopathy  Lungs: CTA  Heart: RRR, no murmur, no thrills or heaves   Ext: no edema  Skin: no rashes        Results for orders placed or performed in visit on 02/22/23   Streptococcus A Rapid Screen w/Reflex to PCR - Clinic Collect     Status: Normal    Specimen: Throat; Swab   Result Value Ref Range    Group A Strep antigen Negative Negative   Influenza A & B Antigen - Clinic Collect     Status: Normal    Specimen: Nasopharyngeal; Swab   Result Value Ref Range    Influenza A antigen Negative Negative    Influenza B antigen Negative Negative    Narrative    Test results must be correlated with clinical data. If necessary, results should be confirmed by a molecular assay or viral culture.             "

## 2023-05-10 ENCOUNTER — OFFICE VISIT (OUTPATIENT)
Dept: FAMILY MEDICINE | Facility: CLINIC | Age: 28
End: 2023-05-10
Payer: COMMERCIAL

## 2023-05-10 VITALS
RESPIRATION RATE: 16 BRPM | TEMPERATURE: 97.8 F | OXYGEN SATURATION: 100 % | WEIGHT: 152.7 LBS | SYSTOLIC BLOOD PRESSURE: 111 MMHG | BODY MASS INDEX: 23.14 KG/M2 | DIASTOLIC BLOOD PRESSURE: 72 MMHG | HEART RATE: 78 BPM | HEIGHT: 68 IN

## 2023-05-10 DIAGNOSIS — N30.01 ACUTE CYSTITIS WITH HEMATURIA: ICD-10-CM

## 2023-05-10 DIAGNOSIS — R35.0 INCREASED FREQUENCY OF URINATION: Primary | ICD-10-CM

## 2023-05-10 DIAGNOSIS — R30.0 DYSURIA: ICD-10-CM

## 2023-05-10 PROBLEM — K29.80 DUODENITIS: Status: ACTIVE | Noted: 2023-05-10

## 2023-05-10 LAB
ALBUMIN UR-MCNC: NEGATIVE MG/DL
APPEARANCE UR: CLEAR
BILIRUB UR QL STRIP: NEGATIVE
COLOR UR AUTO: YELLOW
GLUCOSE UR STRIP-MCNC: NEGATIVE MG/DL
HGB UR QL STRIP: NEGATIVE
KETONES UR STRIP-MCNC: NEGATIVE MG/DL
LEUKOCYTE ESTERASE UR QL STRIP: NEGATIVE
NITRATE UR QL: NEGATIVE
PH UR STRIP: 5.5 [PH] (ref 5–7)
SP GR UR STRIP: 1.02 (ref 1–1.03)
UROBILINOGEN UR STRIP-ACNC: 0.2 E.U./DL

## 2023-05-10 PROCEDURE — 99213 OFFICE O/P EST LOW 20 MIN: CPT | Performed by: PHYSICIAN ASSISTANT

## 2023-05-10 PROCEDURE — 81003 URINALYSIS AUTO W/O SCOPE: CPT | Mod: QW | Performed by: PHYSICIAN ASSISTANT

## 2023-05-10 RX ORDER — NITROFURANTOIN 25; 75 MG/1; MG/1
100 CAPSULE ORAL 2 TIMES DAILY
Qty: 10 CAPSULE | Refills: 0 | Status: SHIPPED | OUTPATIENT
Start: 2023-05-10 | End: 2023-05-15

## 2023-05-10 NOTE — PROGRESS NOTES
Assessment & Plan     Increased frequency of urination    - UA Macroscopic with reflex to Microscopic and Culture  - UA Macroscopic with reflex to Microscopic and Culture    Dysuria    - UA Macroscopic with reflex to Microscopic and Culture  - UA Macroscopic with reflex to Microscopic and Culture    Acute cystitis with hematuria    - nitroFURantoin macrocrystal-monohydrate (MACROBID) 100 MG capsule  Dispense: 10 capsule; Refill: 0  - Urine Culture Aerobic Bacterial - lab collect     Patient was seen for irritative voiding symptoms x1 day.  She has not had worrisome symptoms of fever, abdominal pain, or back pain.  She denies any vaginal symptoms.  She states the symptoms are typical for when she has urinary tract infection.  She is immune suppressed secondary to Crohn's disease and use of Humira.   Her UA is unremarkable today however given her immune suppression we will culture and contact patient with results.  Macrobid given, she plans on holding off on use until culture returns and will push fluids.  If worsening may start abx awaiting culture, leaving the [facility] this weekend and wanted to make sure she had a plan given sx.  .      Vivian Pool PA-C  [facility]    Edith Gutierrez is a 28 year old female who presents to clinic today for the following health issues:  Chief Complaint   Patient presents with     Urinary Problem     Patient urinating frequently, urgency, dysuria, unable to empty bladder since yesterday. Leaving for [place]/long road trip tomorrow-wants to be checked.     History of Present Illness       Reason for visit:  UTI  Symptom onset:  1-3 days ago  Symptoms include:  Urgency, burning, feeling of having to go right after going to the bathroom  Symptom intensity:  Mild  Symptom progression:  Worsening  Had these symptoms before:  Yes  Has tried/received treatment for these symptoms:  Yes  Previous treatment was successful:  Yes  Prior treatment  "description:  Antibiotic  What makes it worse:  No  What makes it better:  Drinking more fluids    She eats 2-3 servings of fruits and vegetables daily.She consumes 1 sweetened beverage(s) daily.She exercises with enough effort to increase her heart rate 30 to 60 minutes per day.  She exercises with enough effort to increase her heart rate 4 days per week.   She is taking medications regularly.    1 day hx of dysuria, frequency, urgengcy.  No fevers, abdomen pain or back pain.  No nausea/vomiting.    No vaginal itching, irritation or change of discharge.        Review of Systems  Constitutional, HEENT, cardiovascular, pulmonary, gi and gu systems are negative, except as otherwise noted.      Patient Active Problem List   Diagnosis     Cervical atypia     Crohn's disease (H)     GERD (gastroesophageal reflux disease)     Multiple benign melanocytic nevi     S/P right hemicolectomy     COVID-19     Duodenitis     Current Outpatient Medications   Medication     adalimumab (HUMIRA *CF*) 40 MG/0.4ML prefilled syringe kit     fidaxomicin (DIFICID) 200 MG tablet     levonorgestrel (KYLEENA) 19.5 MG IUD     nitroFURantoin macrocrystal-monohydrate (MACROBID) 100 MG capsule     No current facility-administered medications for this visit.       Objective    /72 (BP Location: Right arm, Patient Position: Sitting, Cuff Size: Adult Regular)   Pulse 78   Temp 97.8  F (36.6  C)   Resp 16   Ht 1.734 m (5' 8.25\")   Wt 69.3 kg (152 lb 11.2 oz)   SpO2 100%   BMI 23.05 kg/m    Physical Exam   Patient is in no acute distress and appears well.  No costovertebral angle tenderness is present.  Abdomen is soft nontender to light or deep palpation no masses or hepatosplenomegaly present.    Results for orders placed or performed in visit on 05/10/23   UA Macroscopic with reflex to Microscopic and Culture     Status: Normal    Specimen: Urine, Clean Catch   Result Value Ref Range    Color Urine Yellow Colorless, Straw, Light " Yellow, Yellow    Appearance Urine Clear Clear    Glucose Urine Negative Negative mg/dL    Bilirubin Urine Negative Negative    Ketones Urine Negative Negative mg/dL    Specific Gravity Urine 1.025 1.003 - 1.035    Blood Urine Negative Negative    pH Urine 5.5 5.0 - 7.0    Protein Albumin Urine Negative Negative mg/dL    Urobilinogen Urine 0.2 0.2, 1.0 E.U./dL    Nitrite Urine Negative Negative    Leukocyte Esterase Urine Negative Negative    Narrative    Microscopic not indicated

## 2023-05-24 ENCOUNTER — OFFICE VISIT (OUTPATIENT)
Dept: FAMILY MEDICINE | Facility: CLINIC | Age: 28
End: 2023-05-24
Payer: COMMERCIAL

## 2023-05-24 VITALS
TEMPERATURE: 98.2 F | HEART RATE: 87 BPM | BODY MASS INDEX: 22.87 KG/M2 | OXYGEN SATURATION: 98 % | SYSTOLIC BLOOD PRESSURE: 98 MMHG | WEIGHT: 150.9 LBS | DIASTOLIC BLOOD PRESSURE: 60 MMHG | HEIGHT: 68 IN

## 2023-05-24 DIAGNOSIS — D84.9 IMMUNOCOMPROMISED (H): ICD-10-CM

## 2023-05-24 DIAGNOSIS — N87.9 CERVICAL ATYPIA: ICD-10-CM

## 2023-05-24 DIAGNOSIS — Z12.4 SCREENING FOR CERVICAL CANCER: ICD-10-CM

## 2023-05-24 DIAGNOSIS — Z12.83 SKIN CANCER SCREENING: Primary | ICD-10-CM

## 2023-05-24 PROCEDURE — G0145 SCR C/V CYTO,THINLAYER,RESCR: HCPCS | Performed by: NURSE PRACTITIONER

## 2023-05-24 PROCEDURE — 87624 HPV HI-RISK TYP POOLED RSLT: CPT | Performed by: NURSE PRACTITIONER

## 2023-05-24 PROCEDURE — 99214 OFFICE O/P EST MOD 30 MIN: CPT | Performed by: NURSE PRACTITIONER

## 2023-05-24 NOTE — PROGRESS NOTES
Assessment & Plan     (Z12.83) Skin cancer screening  (primary encounter diagnosis)  Comment: Multiple nevi but none of concern.  Should do yearly skin exams  Plan:     (D84.9) Immunocompromised (H)  Comment: Due to Humira for Crohn's.  This is under good control currently per patient  Plan:     (Z12.4) Screening for cervical cancer  Comment: Was due for repeat Pap in December.  She is agreeable to doing that today.  Has Mirena IUD in place that was placed last December.  She is with the same partner.  Has occasional spotting  Plan: Pap screen with HPV - recommended age 30 - 65         years            (N87.9) Cervical atypia  Comment: As above  Plan:     I did tell her that this does not take the place of her annual exam, she will need a pelvic exam but I do recommend she schedule an annual exam                 Nidia Reyna NP  [facility]    Edith Gutierrez is a 28 year old, presenting for the following health issues: here for skin exam, has a spot on her right ear lobe, other than that has no other areas of concern - just a general look over    Takes Humira for Crohn's disease which makes her immune O compromised  Her father had melanoma, recovered doing well  She has no new areas of concern, there is one on her ear that we have been watching    She is overdue for Pap smear and agreeable to doing that today  Skin Check        5/24/2023     7:23 AM   Additional Questions   Roomed by mallory abdalla   Accompanied by self     History of Present Illness       Reason for visit:  Mole check    She eats 0-1 servings of fruits and vegetables daily.She consumes 1 sweetened beverage(s) daily.She exercises with enough effort to increase her heart rate 30 to 60 minutes per day.  She exercises with enough effort to increase her heart rate 4 days per week.   She is taking medications regularly.           Review of Systems         Objective    BP 98/60 (BP Location: Right arm, Patient Position:  "Sitting)   Pulse 87   Temp 98.2  F (36.8  C)   Ht 1.734 m (5' 8.25\")   Wt 68.4 kg (150 lb 14.4 oz)   LMP  (LMP Unknown)   SpO2 98%   BMI 22.78 kg/m    Body mass index is 22.78 kg/m .  Physical Exam   Desires skin cancer screening with dermoscope  Skin Type 2  Face-no lesions of concern  Scalp-no lesions of concern  Arms and legs--no lesions of concern  Chest and abdomen--no lesions of concern  Hands and feet--no lesions of concern  Back--no lesions of concern  Genital area--no lesions of concern.  No inguinal lymphadenopathy, external genitalia appear normal, pelvic exam is done in normal fashion, vault with normal rugated, cervix clear with blue IUD string visible.  No cervical motion tenderness no adnexal tenderness or masses.  Pap smear is obtained    She has a number of flat nevi with globular pattern none of any concerning features or worthy of photography today.                        "

## 2023-05-30 LAB
BKR LAB AP GYN ADEQUACY: NORMAL
BKR LAB AP GYN INTERPRETATION: NORMAL
BKR LAB AP HPV REFLEX: NORMAL
BKR LAB AP PREVIOUS ABNL DX: NORMAL
BKR LAB AP PREVIOUS ABNORMAL: NORMAL
PATH REPORT.COMMENTS IMP SPEC: NORMAL
PATH REPORT.COMMENTS IMP SPEC: NORMAL
PATH REPORT.RELEVANT HX SPEC: NORMAL

## 2023-06-02 ENCOUNTER — PATIENT OUTREACH (OUTPATIENT)
Dept: FAMILY MEDICINE | Facility: CLINIC | Age: 28
End: 2023-06-02
Payer: COMMERCIAL

## 2023-06-02 LAB
HUMAN PAPILLOMA VIRUS 16 DNA: NEGATIVE
HUMAN PAPILLOMA VIRUS 18 DNA: NEGATIVE
HUMAN PAPILLOMA VIRUS FINAL DIAGNOSIS: ABNORMAL
HUMAN PAPILLOMA VIRUS OTHER HR: POSITIVE

## 2023-06-09 ENCOUNTER — TELEPHONE (OUTPATIENT)
Dept: FAMILY MEDICINE | Facility: CLINIC | Age: 28
End: 2023-06-09
Payer: COMMERCIAL

## 2023-06-09 DIAGNOSIS — Z13.29 SCREENING FOR THYROID DISORDER: Primary | ICD-10-CM

## 2023-06-09 NOTE — TELEPHONE ENCOUNTER
Order/Referral Request    Who is requesting: Patient     Orders being requested: TSH/thyroid labs    Reason service is needed/diagnosis: thyroid check    When are orders needed by: asap    Has this been discussed with Provider: Yes    Does patient have a preference on a Group/Provider/Facility? [facility]    Does patient have an appointment scheduled?: Yes: pt has [place] appt on 6/14 with Dr. Benton    Where to send orders: Place orders within Epic    Could we [facility] or would you prefer to receive a phone call?:   Patient would prefer a phone call   Okay to leave a detailed message?: Yes at Home number on file [phone] (home)

## 2023-06-14 ENCOUNTER — OFFICE VISIT (OUTPATIENT)
Dept: OBGYN | Facility: CLINIC | Age: 28
End: 2023-06-14
Payer: COMMERCIAL

## 2023-06-14 VITALS
DIASTOLIC BLOOD PRESSURE: 63 MMHG | HEIGHT: 68 IN | WEIGHT: 153.6 LBS | SYSTOLIC BLOOD PRESSURE: 92 MMHG | OXYGEN SATURATION: 98 % | RESPIRATION RATE: 16 BRPM | BODY MASS INDEX: 23.28 KG/M2 | HEART RATE: 81 BPM

## 2023-06-14 DIAGNOSIS — B97.7 HIGH RISK HUMAN PAPILLOMA VIRUS (HPV) INFECTION OF CERVIX: Primary | ICD-10-CM

## 2023-06-14 DIAGNOSIS — N72 HIGH RISK HUMAN PAPILLOMA VIRUS (HPV) INFECTION OF CERVIX: Primary | ICD-10-CM

## 2023-06-14 DIAGNOSIS — Z13.29 SCREENING FOR THYROID DISORDER: ICD-10-CM

## 2023-06-14 LAB — TSH SERPL DL<=0.005 MIU/L-ACNC: 1.47 UIU/ML (ref 0.3–4.2)

## 2023-06-14 PROCEDURE — 84443 ASSAY THYROID STIM HORMONE: CPT | Performed by: OBSTETRICS & GYNECOLOGY

## 2023-06-14 PROCEDURE — 57452 EXAM OF CERVIX W/SCOPE: CPT | Performed by: OBSTETRICS & GYNECOLOGY

## 2023-06-14 PROCEDURE — 36415 COLL VENOUS BLD VENIPUNCTURE: CPT | Performed by: OBSTETRICS & GYNECOLOGY

## 2023-06-14 NOTE — PROGRESS NOTES
Subjective: The patient is a 28-year-old white female referred by Nidia Reyna for follow-up of HPV on recent Pap smear.  She has a history of ASCUS Pap smear 1-1/2 years ago and this Pap smear came back as normal but persistent HPV.  She has IUD for birth control and is only slight bleeding at times with the IUD.  She has no other concerns.    Objective: Colposcopy is satisfactory using endocervical speculum.  There is no sign of HPV.  The columnar epithelium appears normal with normal metaplasia at the squamocolumnar junction.  No biopsies were taken.  The IUD string is not seen.    Assessment: Exposure to HPV with no JL noted on Pap or colposcopy.  IUD string not seen but previously noted on examination.  No symptoms of IUD misplacement.    Plan: The patient was reassured.  She will have Pap smear in 1 year for follow-up.  The lost IUD string does not need attention at this point.    Cecil Benton MD

## 2023-06-29 ENCOUNTER — OFFICE VISIT (OUTPATIENT)
Dept: FAMILY MEDICINE | Facility: CLINIC | Age: 28
End: 2023-06-29
Payer: COMMERCIAL

## 2023-06-29 VITALS
HEART RATE: 108 BPM | DIASTOLIC BLOOD PRESSURE: 62 MMHG | WEIGHT: 156.5 LBS | BODY MASS INDEX: 23.72 KG/M2 | SYSTOLIC BLOOD PRESSURE: 104 MMHG | RESPIRATION RATE: 16 BRPM | OXYGEN SATURATION: 99 % | HEIGHT: 68 IN | TEMPERATURE: 98.2 F

## 2023-06-29 DIAGNOSIS — F41.1 GAD (GENERALIZED ANXIETY DISORDER): Primary | ICD-10-CM

## 2023-06-29 PROCEDURE — 99214 OFFICE O/P EST MOD 30 MIN: CPT | Performed by: FAMILY MEDICINE

## 2023-06-29 RX ORDER — ESCITALOPRAM OXALATE 10 MG/1
10 TABLET ORAL DAILY
Qty: 30 TABLET | Refills: 3 | Status: SHIPPED | OUTPATIENT
Start: 2023-06-29 | End: 2023-09-14

## 2023-06-29 RX ORDER — LORAZEPAM 0.5 MG/1
0.5 TABLET ORAL EVERY 6 HOURS PRN
Qty: 15 TABLET | Refills: 1 | Status: SHIPPED | OUTPATIENT
Start: 2023-06-29

## 2023-06-29 ASSESSMENT — ANXIETY QUESTIONNAIRES
6. BECOMING EASILY ANNOYED OR IRRITABLE: SEVERAL DAYS
GAD7 TOTAL SCORE: 10
5. BEING SO RESTLESS THAT IT IS HARD TO SIT STILL: SEVERAL DAYS
7. FEELING AFRAID AS IF SOMETHING AWFUL MIGHT HAPPEN: MORE THAN HALF THE DAYS
1. FEELING NERVOUS, ANXIOUS, OR ON EDGE: SEVERAL DAYS
3. WORRYING TOO MUCH ABOUT DIFFERENT THINGS: MORE THAN HALF THE DAYS
2. NOT BEING ABLE TO STOP OR CONTROL WORRYING: MORE THAN HALF THE DAYS
GAD7 TOTAL SCORE: 10

## 2023-06-29 ASSESSMENT — PATIENT HEALTH QUESTIONNAIRE - PHQ9: 5. POOR APPETITE OR OVEREATING: SEVERAL DAYS

## 2023-06-29 NOTE — PROGRESS NOTES
Assessment & Plan     CONTRERAS (generalized anxiety disorder)  Patient will be started on escitalopram.  I have given her prescription for lorazepam to help with her flight anxiety.  She may also use it as needed for panic attacks.  Discussed side effects of medication.  Follow-up in 1 month or sooner as needed.  Referral to behavioral health has been sent.  - escitalopram (LEXAPRO) 10 MG tablet; Take 1 tablet (10 mg) by mouth daily  - LORazepam (ATIVAN) 0.5 MG tablet; Take 1 tablet (0.5 mg) by mouth every 6 hours as needed for anxiety                 Zac Correa MD  [facility]    Edith Gutierrez is a 28 year old, presenting for the following health issues:  Anxiety (Discuss anxiety, noticed about 2-3 yrs ago since COVID started.  Also noticed increased hair loss)        6/29/2023    11:50 AM   Additional Questions   Roomed by Michelle MONTEJO CMA   Accompanied by Self     History of Present Illness       Reason for visit:  Anxiety    She eats 2-3 servings of fruits and vegetables daily.She consumes 1 sweetened beverage(s) daily.She exercises with enough effort to increase her heart rate 30 to 60 minutes per day.  She exercises with enough effort to increase her heart rate 4 days per week.   She is taking medications regularly.       Abnormal Mood Symptoms  Onset/Duration: about 2-3 yrs ago  Description: Anxiety, mild panic attacks, difficulty sleeping, situational stressors  Depression (if yes, do PHQ-9):   Anxiety (if yes, do CONTRERAS-7): YES  Accompanying Signs & Symptoms:  Still participating in activities that you used to enjoy: YES  Fatigue: YES  Irritability: No  Difficulty concentrating: YES  Changes in appetite: No  Problems with sleep: YES  Heart racing/beating fast: YES- sometimes  Abnormally elevated, expansive, or irritable mood: No  Persistently increased activity or energy: No  Thoughts of hurting yourself or others: No  History:  Recent stress or major life event: YES-  "financial, health, job  Prior depression or anxiety: None  Family history of depression or anxiety: YES- father with anxiety  Alcohol/drug use: YES- alcohol  Difficulty sleeping: YES  Precipitating or alleviating factors: None  Therapies tried and outcome: none       No data to display                  6/29/2023    11:55 AM   CONTRERAS-7 SCORE   Total Score 10     Patient is here for discussion of anxiety treatment.  She has had difficulties over the last 3 years.  She has excessive worry, intermittent panic attacks, and difficulty sleeping.  She also has a fear of flying.  She has not had treatment for her anxiety disorder in the past.    She reports her job as a  is particularly stressful.  She is considering changing occupations.  She also is dealing with chronic illness.  Currently her Crohn's disease is well controlled.      Review of Systems   Constitutional, HEENT, cardiovascular, pulmonary, gi and gu systems are negative, except as otherwise noted.      Objective    /62 (BP Location: Right arm, Patient Position: Sitting, Cuff Size: Adult Regular)   Pulse 108   Temp 98.2  F (36.8  C) (Tympanic)   Resp 16   Ht 1.734 m (5' 8.25\")   Wt 71 kg (156 lb 8 oz)   LMP  (LMP Unknown)   SpO2 99%   BMI 23.62 kg/m    Body mass index is 23.62 kg/m .  Physical Exam   General:  Alert and oriented, No acute distress.    Eye: Normal conjunctiva.     HENT:  Oral mucosa is moist.     Neck:  Supple.     Respiratory:  Respirations are non-labored.     Cardiovascular:  Normal rate   Musculoskeletal:  Normal gait.     Psychiatric:  Cooperative, Anxious mood & affect, Normal judgment.                       "

## 2023-07-18 ENCOUNTER — PATIENT OUTREACH (OUTPATIENT)
Dept: FAMILY MEDICINE | Facility: CLINIC | Age: 28
End: 2023-07-18
Payer: COMMERCIAL

## 2023-08-13 ENCOUNTER — HEALTH MAINTENANCE LETTER (OUTPATIENT)
Age: 28
End: 2023-08-13

## 2023-08-23 ENCOUNTER — OFFICE VISIT (OUTPATIENT)
Dept: FAMILY MEDICINE | Facility: CLINIC | Age: 28
End: 2023-08-23
Payer: COMMERCIAL

## 2023-08-23 VITALS
SYSTOLIC BLOOD PRESSURE: 100 MMHG | OXYGEN SATURATION: 99 % | DIASTOLIC BLOOD PRESSURE: 64 MMHG | RESPIRATION RATE: 16 BRPM | BODY MASS INDEX: 23.34 KG/M2 | WEIGHT: 154 LBS | HEART RATE: 84 BPM | TEMPERATURE: 98.6 F | HEIGHT: 68 IN

## 2023-08-23 DIAGNOSIS — L65.9 HAIR LOSS: ICD-10-CM

## 2023-08-23 DIAGNOSIS — B35.1 ONYCHOMYCOSIS: Primary | ICD-10-CM

## 2023-08-23 PROCEDURE — 99213 OFFICE O/P EST LOW 20 MIN: CPT | Performed by: FAMILY MEDICINE

## 2023-08-23 RX ORDER — TERBINAFINE HYDROCHLORIDE 250 MG/1
250 TABLET ORAL DAILY
Qty: 90 TABLET | Refills: 0 | Status: SHIPPED | OUTPATIENT
Start: 2023-08-23 | End: 2024-05-08

## 2023-08-23 NOTE — PROGRESS NOTES
"  Assessment & Plan     Onychomycosis  We will treat with terbinafine.  If she does not see improvement in her multiple liquid dermatology  - terbinafine (LAMISIL) 250 MG tablet; Take 1 tablet (250 mg) by mouth daily for 90 days    Hair loss  Would like to see dermatology about her hair loss  - Adult Dermatology Referral; Future                 Edmar Bermudez MD  [facility]    Edith Gutierrez is a 28 year old, presenting for the following health issues: Patient had issues with her left great toenail for a long time.  Gets very thick and yellow no pain no injury.  She also notes a lot of hair loss that she like to see dermatology for.  She had normal thyroid function  Nail Problem (L great toenail)        8/23/2023    11:14 AM   Additional Questions   Roomed by Viola       History of Present Illness       Reason for visit:  Infected toenail  Symptom onset:  More than a month  Symptoms include:  Discolored toe nail, the toe nail keeps dying/not growing properly  Symptom intensity:  Mild  Symptom progression:  Staying the same  Had these symptoms before:  No  What makes it worse:  No  What makes it better:  No    She eats 2-3 servings of fruits and vegetables daily.She consumes 1 sweetened beverage(s) daily.She exercises with enough effort to increase her heart rate 30 to 60 minutes per day.  She exercises with enough effort to increase her heart rate 4 days per week.   She is taking medications regularly.               Review of Systems   Constitutional, HEENT, cardiovascular, pulmonary, gi and gu systems are negative, except as otherwise noted.      Objective    /64 (BP Location: Right arm, Patient Position: Sitting, Cuff Size: Adult Regular)   Pulse 84   Temp 98.6  F (37  C) (Temporal)   Resp 16   Ht 1.734 m (5' 8.25\")   Wt 69.9 kg (154 lb)   LMP  (LMP Unknown)   SpO2 99%   BMI 23.24 kg/m    Body mass index is 23.24 kg/m .  Physical Exam   Alert no apparent " distress left first toenail reveals significant thickening and yellowing.  Otherwise CMS intact

## 2023-08-29 ENCOUNTER — TELEPHONE (OUTPATIENT)
Dept: FAMILY MEDICINE | Facility: CLINIC | Age: 28
End: 2023-08-29

## 2023-08-29 ENCOUNTER — E-VISIT (OUTPATIENT)
Dept: FAMILY MEDICINE | Facility: CLINIC | Age: 28
End: 2023-08-29
Payer: COMMERCIAL

## 2023-08-29 DIAGNOSIS — Z20.822 SUSPECTED COVID-19 VIRUS INFECTION: Primary | ICD-10-CM

## 2023-08-29 PROCEDURE — 99207 PR NON-BILLABLE SERV PER CHARTING: CPT | Performed by: FAMILY MEDICINE

## 2023-08-29 NOTE — TELEPHONE ENCOUNTER
Patient set up an E-Visit today 08/29/23 with Dr Bermudez to get a COVID test done but hasn't heard anything. Wondering if she could get one.

## 2023-08-29 NOTE — TELEPHONE ENCOUNTER
Pt informed that Dr. Bermudez is [place] this week but that Dr. Romeo did take over the e-visit and did place an order to be tested for COVID and to schedule lab appt.

## 2023-08-29 NOTE — PATIENT INSTRUCTIONS
Dear Brenda,      Based on your responses, you may have COVID-19.     Will I be tested for COVID-19?  We would like to test you for COVID. I have placed orders for this test.     To schedule: go to your [employer] home page and scroll down to the section that says  You have an appointment that needs to be scheduled  and click the large green button that says  Schedule Now  and follow the steps to find the next available openings.    If you are unable to complete these [employer] scheduling steps, please call [phone] to schedule your testing.     How do I self-isolate?  You isolate when you have symptoms of COVID or a test shows you have COVID, even if you don t have symptoms.   If you DO have symptoms:  Stay home and away from others  For at least 5 days after your symptoms started, AND   You are fever free for 24 hours (with no medicine that reduces fever), AND  Your other symptoms are better.  Wear a mask for 10 full days any time you are around others.  If you DON T have symptoms:  Stay at home and away from others for at least 5 days after your positive test.  Wear a mask for 10 full days any time you are around others.    How can I take care of myself?  Over the counter medications may help with your symptoms such as runny or stuffy nose, cough, chills, or fever.  Talk to your care team about your options.     Some people are at high risk of severe illness (for example, you have a weak immune system, you re 50 years or older, or you have certain medical problems). If your risk is high and your symptoms started in the last 5 days, we strongly recommend for you to get COVID treatment as soon as possible.There are safe and effective medicines available that can make you feel better faster, and prevent hospitalization and death.       To discuss COVID treatment you can:  Call your clinic OR [phone] ([phone]) and ask to speak to a nurse about a positive COVID test.  Send a [employer] message to your care  "team. In [employer], select \"Ask a Medical Question\" then \"Do I need an appointment\" and put \"COVID\" in the subject line. Please include a phone number to call you back in the message.             Get lots of rest. Drink extra fluids (unless a doctor has told you not to)  Take Tylenol (acetaminophen) or ibuprofen for fever or pain. If you have liver or kidney problems, ask your family doctor if it's okay to take Tylenol or ibuprofen  Take over the counter medications for your symptoms, as directed by your doctor. You may also talk to your pharmacist.    If you have other health problems (like cancer, heart failure, an organ transplant or severe kidney disease): Call your specialty clinic if you don't feel better in the next 2 days.  Know when to call 911. Emergency warning signs include:  Trouble breathing or shortness of breath  Pain or pressure in the chest that doesn't go away  Feeling confused like you haven't felt before, or not being able to wake up  Bluish-colored lips or face    Where can I get more information?  [facility] - About COVID-19: [email or web address]   CDC - What to Do If You're Sick: https://www.cdc.gov/coronavirus/2019-ncov/if-you-are-sick/index.html   CDC -  Isolation https://www.cdc.gov/coronavirus/2019-ncov/your-health/isolation.html    "

## 2023-08-30 ENCOUNTER — LAB (OUTPATIENT)
Dept: LAB | Facility: CLINIC | Age: 28
End: 2023-08-30
Attending: FAMILY MEDICINE
Payer: COMMERCIAL

## 2023-08-30 DIAGNOSIS — Z20.822 SUSPECTED COVID-19 VIRUS INFECTION: ICD-10-CM

## 2023-08-30 PROCEDURE — 87635 SARS-COV-2 COVID-19 AMP PRB: CPT

## 2023-08-31 ENCOUNTER — TELEPHONE (OUTPATIENT)
Dept: NURSING | Facility: CLINIC | Age: 28
End: 2023-08-31
Payer: COMMERCIAL

## 2023-08-31 LAB — SARS-COV-2 RNA RESP QL NAA+PROBE: POSITIVE

## 2023-08-31 NOTE — TELEPHONE ENCOUNTER
Coronavirus (COVID-19) Notification    Caller Name (Patient, parent, daughter/son, grandparent, etc)  patient    Reason for call  Notify of Positive Coronavirus (COVID-19) lab results, assess symptoms,  review [facility] recommendations    Lab Result    Lab test:  2019-nCoV rRt-PCR or SARS-CoV-2 PCR    Oropharyngeal AND/OR nasopharyngeal swabs is POSITIVE for 2019-nCoV RNA/SARS-COV-2 PCR (COVID-19 virus)      Gather patient reported symptoms   Assessment   Current Symptoms at time of phone call, reported by patient: (if no symptoms, document: No symptoms] Chest pain and pressure, headache- refused triage nurse   Date of symptom(s) onset (if applicable) 8/28/23     If at time of call, Patients symptoms have worsened, the Patient should contact 911 or have someone drive them to Emergency Dept promptly:    If Patient calling 911, inform 911 personal that you have tested positive for the Coronavirus (COVID-19).  Place mask on and await 911 to arrive.  If Emergency Dept, If possible, please have another adult drive you to the Emergency Dept but you need to wear mask when in contact with other people.      Treatment Options:   Is patient interested in discussing COVID treatment? Yes.   Is this a [place] or [place] Patient? No    Are you an agent trained to scheduling? Yes.     Review information with Patient    Your result was positive. This means you have COVID-19 (coronavirus).    How can I protect others?    These guidelines are for isolating before returning to work, school or .    If you DO have symptoms  Stay home and away from others   For at least 5 days after your symptoms started, AND  You are fever free for 24 hours (with no medicine that reduces fever), AND  Your other symptoms are better  Wear a mask for 10 full days anytime you are around others    If you DON'T have symptoms  Stay home and away from others for at least 5 days after your positive test  Wear a mask for 10 full days anytime you  are around others    There may be different guidelines for healthcare facilities.  Please check with the specific sites before arriving.    If you have been told by a doctor that you were severely ill with COVID-19 or are immunocompromised, you should isolate for at least 10 days.    You should not go back to work until you meet the guidelines above for ending your home isolation. You don't need to be retested for COVID-19 before going back to work--studies show that you won't spread the virus if it's been at least 10 days since your symptoms started (or 20 days, if you have a weak immune system).    Employers, schools, and daycares: This is an official notice for this person's medical guidelines for returning in-person.  They must meet the above guidelines before going back to work, school or  in person.    You will receive a positive COVID-19 letter via [employer] or the mail soon with additional self-care information.    Would you like me to review some of that information with you now?  No    If you were tested for an upcoming procedure, please contact your provider for next steps.    Cleo Silva

## 2023-09-14 ENCOUNTER — NURSE TRIAGE (OUTPATIENT)
Dept: NURSING | Facility: CLINIC | Age: 28
End: 2023-09-14

## 2023-09-14 ENCOUNTER — VIRTUAL VISIT (OUTPATIENT)
Dept: FAMILY MEDICINE | Facility: CLINIC | Age: 28
End: 2023-09-14
Payer: COMMERCIAL

## 2023-09-14 DIAGNOSIS — R41.840 ATTENTION DEFICIT: ICD-10-CM

## 2023-09-14 DIAGNOSIS — F41.1 GAD (GENERALIZED ANXIETY DISORDER): Primary | ICD-10-CM

## 2023-09-14 PROCEDURE — 99213 OFFICE O/P EST LOW 20 MIN: CPT | Mod: VID | Performed by: FAMILY MEDICINE

## 2023-09-14 ASSESSMENT — ANXIETY QUESTIONNAIRES
5. BEING SO RESTLESS THAT IT IS HARD TO SIT STILL: NOT AT ALL
3. WORRYING TOO MUCH ABOUT DIFFERENT THINGS: NOT AT ALL
GAD7 TOTAL SCORE: 2
7. FEELING AFRAID AS IF SOMETHING AWFUL MIGHT HAPPEN: NOT AT ALL
1. FEELING NERVOUS, ANXIOUS, OR ON EDGE: NOT AT ALL
GAD7 TOTAL SCORE: 2
2. NOT BEING ABLE TO STOP OR CONTROL WORRYING: NOT AT ALL
6. BECOMING EASILY ANNOYED OR IRRITABLE: SEVERAL DAYS

## 2023-09-14 ASSESSMENT — PATIENT HEALTH QUESTIONNAIRE - PHQ9: 5. POOR APPETITE OR OVEREATING: SEVERAL DAYS

## 2023-09-14 NOTE — PROGRESS NOTES
Brenda is a 28 year old who is being evaluated via a billable video visit.      How would you like to obtain your AVS? MyChart  If the video visit is dropped, the invitation should be resent by: Text to cell phone: [phone]  Will anyone else be joining your video visit? No          Assessment & Plan     CONTRERAS (generalized anxiety disorder)  Her anxiety has improved.  She has worked through some of her most concerning issues.  She had side effects from a escitalopram.  Additional medication will not be prescribed at this time.  If she has problems in the future consider venlafaxine or duloxetine.  - Adult Mental Health  Referral; Future    Attention deficit  She has ongoing difficulties with organization and project completion.  She has concerns about an underlying attention deficit disorder.  She would like to pursue formal testing and this has been ordered.  - Adult Mental Health  Referral; Future                 Zac Correa MD  [facility]   Brenda is a 28 year old, presenting for the following health issues:  Anxiety (Verbal consent given for video visit.  Anxiety follow up)        9/14/2023     5:01 PM   Additional Questions   Roomed by Michelle MONTEJO CMA   Accompanied by Self       HPI     Anxiety Follow-Up  How are you doing with your anxiety since your last visit? Improved stopped taking Lexapro  Are you having other symptoms that might be associated with anxiety? No  Have you had a significant life event? No   Are you feeling depressed? No  Do you have any concerns with your use of alcohol or other drugs? No    Social History     Tobacco Use    Smoking status: Never    Smokeless tobacco: Never   Vaping Use    Vaping Use: Never used         6/29/2023    11:55 AM 9/14/2023     4:59 PM   CONTRERAS-7 SCORE   Total Score 10 2          No data to display                  9/14/2023     4:59 PM   CONTRERAS-7    1. Feeling nervous, anxious, or on edge 0   2. Not being  able to stop or control worrying 0   3. Worrying too much about different things 0   4. Trouble relaxing 1   5. Being so restless that it is hard to sit still 0   6. Becoming easily annoyed or irritable 1   7. Feeling afraid, as if something awful might happen 0   CONTRERAS-7 Total Score 2         She has concerns about attention deficit disorder.  She has difficulty staying organized and occasionally has trouble completing projects.  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  She wonders if this may be part of her anxiety disorder.  Review of Systems         Objective           Vitals:  No vitals were obtained today due to virtual visit.    Physical Exam   GENERAL: Healthy, alert and no distress  EYES: Eyes grossly normal to inspection.  No discharge or erythema, or obvious scleral/conjunctival abnormalities.  RESP: No audible wheeze, cough, or visible cyanosis.  No visible retractions or increased work of breathing.    SKIN: Visible skin clear. No significant rash, abnormal pigmentation or lesions.  NEURO: Cranial nerves grossly intact.  Mentation and speech appropriate for age.  PSYCH: Mentation appears normal, affect normal/bright, judgement and insight intact, normal speech and appearance well-groomed.                Video-Visit Details    Type of service:  Video Visit     Originating Location (pt. Location): Home    Distant Location (provider location):  On-site  Platform used for Video Visit: [email or web address]

## 2023-09-15 NOTE — TELEPHONE ENCOUNTER
Nurse Triage SBAR    Is this a 2nd Level Triage? NO    Situation: Pt calling with concerns for medication side effects.    Background: Pt has been on Lamisil since 8/23 and Nitrofurantoin since 9/12. Symptoms started today.    Assessment: Pt is c/o HA, dizziness, sore throat, fatigue, chills, and tingling in her arms and legs. Medication adverse effects reviewed which do not include the above. Triaged for other symptoms. Dizziness has gotten worse t/o the day. HA has not gotten better despite Tylenol. Denies fever, SOB, and numbness.    Protocol Recommended Disposition:   Go to ED Now, See PCP Within 3 Days    Recommendation: Dispo to go to ER. Pt aware of location she will go to.      Reason for Disposition   [1] MILD dizziness (e.g., walking normally) AND [2] has NOT been evaluated by doctor (or NP/PA) for this  (Exception: Dizziness caused by heat exposure, sudden standing, or poor fluid intake.)   Headache  (and neurologic deficit)    Additional Information   Negative: SEVERE difficulty breathing (e.g., struggling for each breath, speaks in single words)   Negative: Sounds like a life-threatening emergency to the triager   Negative: Difficult to awaken or acting confused (e.g., disoriented, slurred speech)   Negative: [1] Weakness (i.e., paralysis, loss of muscle strength) of the face, arm or leg on one side of the body AND [2] sudden onset AND [3] present now   Negative: [1] Numbness (i.e., loss of sensation) of the face, arm or leg on one side of the body AND [2] sudden onset AND [3] present now   Negative: Overdose (accidental or intentional) of medications   Negative: [1] Loss of speech or garbled speech AND [2] sudden onset AND [3] present now   Negative: [1] Difficulty breathing or swallowing AND [2] started suddenly after medicine, an allergic food or bee sting   Negative: [1] Fainted > 15 minutes ago AND [2] still feels too weak or dizzy to stand   Negative: Shock suspected (e.g., cold/pale/clammy skin,  "too weak to stand, low BP, rapid pulse)   Negative: Heart beating < 50 beats per minute OR > 140 beats per minute   Negative: Difficulty breathing   Negative: SEVERE dizziness (e.g., unable to stand, requires support to walk, feels like passing out now)   Negative: [1] Weakness (i.e., paralysis, loss of muscle strength) of the face, arm / hand, or leg / foot on one side of the body AND [2] sudden onset AND [3] brief (now gone)   Negative: [1] Numbness (i.e., loss of sensation) of the face, arm / hand, or leg / foot on one side of the body AND [2] sudden onset AND [3] brief (now gone)   Negative: Patient sounds very sick or weak to the triager   Negative: [1] Dizziness caused by heat exposure, sudden standing, or poor fluid intake AND [2] no improvement after 2 hours of rest and fluids   Negative: [1] Fever > 103 F (39.4 C) AND [2] not able to get the fever down using Fever Care Advice   Negative: [1] Fever > 101 F (38.3 C) AND [2] age > 60 years   Negative: [1] Fever > 100.0 F (37.8 C) AND [2] bedridden (e.g., CVA, chronic illness, recovering from surgery)   Negative: [1] Fever > 100.0 F (37.8 C) AND [2] diabetes mellitus or weak immune system (e.g., HIV positive, cancer chemo, splenectomy, organ transplant, chronic steroids)   Negative: Extra heartbeats, irregular heart beating, or heart is beating very fast  (i.e., \"palpitations\")   Negative: [1] Drinking very little AND [2] dehydration suspected (e.g., no urine > 12 hours, very dry mouth, very lightheaded)   Negative: [1] Loss of speech or garbled speech AND [2] sudden onset AND [3] brief (now gone)   Negative: Loss of vision or double vision  (Exception: Similar to previous migraines.)   Negative: Fever present > 3 days (72 hours)   Negative: Taking a medicine that could cause dizziness (e.g., blood pressure medications, diuretics)   Negative: [1] MODERATE dizziness (e.g., interferes with normal activities) AND [2] has NOT been evaluated by doctor (or NP/PA) for " this  (Exception: Dizziness caused by heat exposure, sudden standing, or poor fluid intake.)   Negative: [1] MODERATE dizziness (e.g., interferes with normal activities) AND [2] has been evaluated by doctor (or NP/PA) for this   Negative: [1] SEVERE weakness (i.e., unable to walk or barely able to walk, requires support) AND [2] new-onset or worsening   Negative: [1] Weakness (i.e., paralysis, loss of muscle strength) of the face, arm / hand, or leg / foot on one side of the body AND [2] sudden onset AND [3] present now  (Exception: Bell's palsy suspected [i.e., weakness only on one side of the face, developing over hours to days, no other symptoms].)   Negative: [1] Numbness (i.e., loss of sensation) of the face, arm / hand, or leg / foot on one side of the body AND [2] sudden onset AND [3] present now   Negative: [1] Loss of speech or garbled speech AND [2] sudden onset AND [3] present now   Negative: Difficult to awaken or acting confused (e.g., disoriented, slurred speech)   Negative: Sounds like a life-threatening emergency to the triager   Negative: [1] Back pain AND [2] numbness (loss of sensation) in groin or rectal area   Negative: [1] Unable to urinate (or only a few drops) > 4 hours AND [2] bladder feels very full (e.g., palpable bladder or strong urge to urinate)   Negative: [1] Loss of bladder or bowel control (urine or bowel incontinence; wetting self, leaking stool) AND [2] new-onset    Protocols used: Dizziness - [id number], Neurologic Deficit-A-    Leticia Das RN  [facility] Nurse Advisor   9/14/2023  9:19 PM

## 2023-10-04 ENCOUNTER — OFFICE VISIT (OUTPATIENT)
Dept: FAMILY MEDICINE | Facility: CLINIC | Age: 28
End: 2023-10-04
Payer: COMMERCIAL

## 2023-10-04 VITALS
BODY MASS INDEX: 23.34 KG/M2 | HEIGHT: 68 IN | SYSTOLIC BLOOD PRESSURE: 102 MMHG | RESPIRATION RATE: 14 BRPM | HEART RATE: 86 BPM | DIASTOLIC BLOOD PRESSURE: 70 MMHG | WEIGHT: 154 LBS | TEMPERATURE: 98.7 F | OXYGEN SATURATION: 98 %

## 2023-10-04 DIAGNOSIS — R30.0 DYSURIA: Primary | ICD-10-CM

## 2023-10-04 LAB
ALBUMIN UR-MCNC: NEGATIVE MG/DL
APPEARANCE UR: CLEAR
BACTERIA #/AREA URNS HPF: ABNORMAL /HPF
BILIRUB UR QL STRIP: NEGATIVE
CLUE CELLS: ABNORMAL
COLOR UR AUTO: YELLOW
GLUCOSE UR STRIP-MCNC: NEGATIVE MG/DL
HGB UR QL STRIP: ABNORMAL
KETONES UR STRIP-MCNC: NEGATIVE MG/DL
LEUKOCYTE ESTERASE UR QL STRIP: ABNORMAL
NITRATE UR QL: NEGATIVE
PH UR STRIP: 7 [PH] (ref 5–7)
RBC #/AREA URNS AUTO: ABNORMAL /HPF
SP GR UR STRIP: 1.01 (ref 1–1.03)
SQUAMOUS #/AREA URNS AUTO: ABNORMAL /LPF
TRICHOMONAS, WET PREP: ABNORMAL
UROBILINOGEN UR STRIP-ACNC: 0.2 E.U./DL
WBC #/AREA URNS AUTO: ABNORMAL /HPF
WBC'S/HIGH POWER FIELD, WET PREP: ABNORMAL
YEAST, WET PREP: ABNORMAL

## 2023-10-04 PROCEDURE — 81001 URINALYSIS AUTO W/SCOPE: CPT | Performed by: PHYSICIAN ASSISTANT

## 2023-10-04 PROCEDURE — 87086 URINE CULTURE/COLONY COUNT: CPT | Performed by: PHYSICIAN ASSISTANT

## 2023-10-04 PROCEDURE — 87210 SMEAR WET MOUNT SALINE/INK: CPT | Mod: QW | Performed by: PHYSICIAN ASSISTANT

## 2023-10-04 PROCEDURE — 99213 OFFICE O/P EST LOW 20 MIN: CPT | Performed by: PHYSICIAN ASSISTANT

## 2023-10-04 RX ORDER — NITROFURANTOIN 25; 75 MG/1; MG/1
100 CAPSULE ORAL 2 TIMES DAILY
Qty: 10 CAPSULE | Refills: 0 | Status: SHIPPED | OUTPATIENT
Start: 2023-10-04 | End: 2023-10-09

## 2023-10-04 NOTE — PROGRESS NOTES
Assessment & Plan     Dysuria  Pt was seen for irritative voiding symptoms.    Her UA is unremarkable, await culture.  Her wet prep is unremarkable.    Given macrobid awaiting results given her history of immune suppression and recent uti.  Offered sti screening, pt defers.        - UA Macroscopic with reflex to Microscopic and Culture - Lab Collect  - UA Macroscopic with reflex to Microscopic and Culture - Lab Collect  - UA Microscopic with Reflex to Culture  - Wet prep - Clinic Collect  - nitroFURantoin macrocrystal-monohydrate (MACROBID) 100 MG capsule  Dispense: 10 capsule; Refill: 0  - Urine Culture Aerobic Bacterial - lab collect  - Urine Culture Aerobic Bacterial - lab collect         Vivian Pool PA-C  [facility]    Edith Gutierrez is a 28 year old female who presents to clinic today for the following health issues:  Chief Complaint   Patient presents with    UTI     Follow up ED visit for UTI. Symptoms improved for about 2 weeks but are now back.     History of Present Illness       Reason for visit:  Uti  Symptom onset:  1-3 days ago  Symptom intensity:  Moderate  Symptom progression:  Worsening  Had these symptoms before:  Yes  Has tried/received treatment for these symptoms:  Yes  Previous treatment was successful:  No  What makes it worse:  Being dehydrated  What makes it better:  Drinking lots of fluid    She eats 2-3 servings of fruits and vegetables daily.She consumes 1 sweetened beverage(s) daily.She exercises with enough effort to increase her heart rate 30 to 60 minutes per day.  She exercises with enough effort to increase her heart rate 3 or less days per week.   She is taking medications regularly.  UTI with irritative voiding 2 weeks ago treated with keflex.  Improved, now worsening. Culture at that time was not done due to no pyuria.  Had strep throat at the times as well as odor, urgency, frequency.  Sx worse the last few days.   Odor, dysuria,  "incomplete voiding, frequency.    No vaginal sx.    Took one macrobid.    No abdomen pain or back pain.    No fevers or chills.  No rash.        Review of Systems  Constitutional, HEENT, cardiovascular, pulmonary, gi and gu systems are negative, except as otherwise noted.      Objective    /70   Pulse 86   Temp 98.7  F (37.1  C)   Resp 14   Ht 1.734 m (5' 8.25\")   Wt 69.9 kg (154 lb)   LMP  (LMP Unknown)   SpO2 98%   BMI 23.24 kg/m    Physical Exam   Patient is in no acute distress and appears well.  No costovertebral angle tenderness is present.  Abdomen is soft nontender to light or deep palpation no masses or hepatosplenomegaly present.  Results for orders placed or performed in visit on 10/04/23   UA Macroscopic with reflex to Microscopic and Culture - Lab Collect     Status: Abnormal    Specimen: Urine, Clean Catch   Result Value Ref Range    Color Urine Yellow Colorless, Straw, Light Yellow, Yellow    Appearance Urine Clear Clear    Glucose Urine Negative Negative mg/dL    Bilirubin Urine Negative Negative    Ketones Urine Negative Negative mg/dL    Specific Gravity Urine 1.010 1.003 - 1.035    Blood Urine Trace (A) Negative    pH Urine 7.0 5.0 - 7.0    Protein Albumin Urine Negative Negative mg/dL    Urobilinogen Urine 0.2 0.2, 1.0 E.U./dL    Nitrite Urine Negative Negative    Leukocyte Esterase Urine Small (A) Negative   UA Microscopic with Reflex to Culture     Status: Abnormal   Result Value Ref Range    Bacteria Urine Few (A) None Seen /HPF    RBC Urine 0-2 0-2 /HPF /HPF    WBC Urine 0-5 0-5 /HPF /HPF    Squamous Epithelials Urine Few (A) None Seen /LPF    Narrative    Urine Culture not indicated   Wet prep - Clinic Collect     Status: Abnormal    Specimen: Vagina; Swab   Result Value Ref Range    Trichomonas Absent Absent    Yeast Absent Absent    Clue Cells Absent Absent    WBCs/high power field 2+ (A) None                 "

## 2023-10-06 LAB — BACTERIA UR CULT: NO GROWTH

## 2023-12-21 ENCOUNTER — OFFICE VISIT (OUTPATIENT)
Dept: FAMILY MEDICINE | Facility: CLINIC | Age: 28
End: 2023-12-21
Payer: COMMERCIAL

## 2023-12-21 VITALS
BODY MASS INDEX: 24.08 KG/M2 | WEIGHT: 158.9 LBS | SYSTOLIC BLOOD PRESSURE: 100 MMHG | DIASTOLIC BLOOD PRESSURE: 70 MMHG | RESPIRATION RATE: 16 BRPM | HEART RATE: 99 BPM | HEIGHT: 68 IN | TEMPERATURE: 98.2 F | OXYGEN SATURATION: 99 %

## 2023-12-21 DIAGNOSIS — K50.919 CROHN'S DISEASE WITH COMPLICATION, UNSPECIFIED GASTROINTESTINAL TRACT LOCATION (H): ICD-10-CM

## 2023-12-21 DIAGNOSIS — B35.1 ONYCHOMYCOSIS: Primary | ICD-10-CM

## 2023-12-21 LAB
ALT SERPL W P-5'-P-CCNC: 9 U/L (ref 0–50)
AST SERPL W P-5'-P-CCNC: 18 U/L (ref 0–45)

## 2023-12-21 PROCEDURE — 84450 TRANSFERASE (AST) (SGOT): CPT | Performed by: NURSE PRACTITIONER

## 2023-12-21 PROCEDURE — 99213 OFFICE O/P EST LOW 20 MIN: CPT | Performed by: NURSE PRACTITIONER

## 2023-12-21 PROCEDURE — 84460 ALANINE AMINO (ALT) (SGPT): CPT | Performed by: NURSE PRACTITIONER

## 2023-12-21 PROCEDURE — 80145 DRUG ASSAY ADALIMUMAB: CPT | Mod: 90 | Performed by: NURSE PRACTITIONER

## 2023-12-21 PROCEDURE — 36415 COLL VENOUS BLD VENIPUNCTURE: CPT | Performed by: NURSE PRACTITIONER

## 2023-12-21 PROCEDURE — 99000 SPECIMEN HANDLING OFFICE-LAB: CPT | Performed by: NURSE PRACTITIONER

## 2023-12-21 PROCEDURE — 82542 COL CHROMOTOGRAPHY QUAL/QUAN: CPT | Mod: 90 | Performed by: NURSE PRACTITIONER

## 2023-12-21 ASSESSMENT — ENCOUNTER SYMPTOMS
CARDIOVASCULAR NEGATIVE: 1
GASTROINTESTINAL NEGATIVE: 1
CONSTITUTIONAL NEGATIVE: 1
RESPIRATORY NEGATIVE: 1

## 2023-12-21 NOTE — PROGRESS NOTES
Assessment & Plan     Onychomycosis  Brenda completed 3 months of terbinafine without improvement in right great toenail fungus infection.  She denies any concerning side effects to terbinafine.  There are no obvious signs of healthy toenail growth of the right great toenail.  We discussed starting an alternative antifungal medication since infection was unresponsive to terbinafine.  Recommend itraconazole 200 mg daily x 12 weeks.  We discussed potential side effects including headache, upset stomach and liver function abnormality.  Since she has been on 3 months of terbinafine, recommend checking ALT and AST today.  If within normal limits, we can proceed with itraconazole.  I did check for interactions with her Humira, up-to-date shows no grade A interaction, epocrates recommends monitoring for cardiotoxicity.    - ALT  - AST    Crohn disease (H)  Consider in management of onychomycosis.  Above, medication interactions between itraconazole and Humira were checked.  She is followed by gastroenterology.  Lab work previously ordered by gastroenterology and can be completed today.  - Adalimumab Level and Antibodies                 ROSELINE Sherwood CNP  [facility]    Subjective   Brenda is a 28 year old, presenting for the following health issues:  Infection (Infection on Lt big toe for 2 years/)        12/21/2023     8:48 AM   Additional Questions   Roomed by LENARD Sawyer       Brenda is a very pleasant 28-year-old female patient with Crohn's disease who presents today for follow-up for toenail infection.  She was diagnosed with onychomycosis of the right great toenail and started on terbinafine for 3 months treatment in 8/2023.  She has had trouble with discoloration, thickened toenail for 1 to 2 years.  No pain or problems with it.  There is sometimes a little bit of discomfort but nothing significant.  No injury that she knows of.  She states about a year and a half ago she cut off  "some dead toenail but it never grew out properly.  Describes that \"everything was dead underneath and colored\".  She tried prenatal vitamin for the hair and nail benefit but noticed no change.  He also notices no change with terbinafine treatment for 3 months.  There is no obvious healthy nail growth.  She was also having trouble with hair thinning.  She was referred to dermatology at office visit in August 2023 with Dr. Bermudez, but unfortunately there is a long wait.      History of Present Illness       Reason for visit:  Infected big toenail    She eats 0-1 servings of fruits and vegetables daily.She consumes 1 sweetened beverage(s) daily.She exercises with enough effort to increase her heart rate 20 to 29 minutes per day.  She exercises with enough effort to increase her heart rate 3 or less days per week.   She is taking medications regularly.                 Review of Systems   Constitutional: Negative.    HENT: Negative.     Respiratory: Negative.     Cardiovascular: Negative.    Gastrointestinal: Negative.    Skin:         Thickened, discolored and flaky right great toenail.            Objective    /70 (BP Location: Right arm, Patient Position: Sitting, Cuff Size: Adult Regular)   Pulse 99   Temp 98.2  F (36.8  C) (Tympanic)   Resp 16   Ht 1.734 m (5' 8.25\")   Wt 72.1 kg (158 lb 14.4 oz)   SpO2 99%   BMI 23.98 kg/m    Body mass index is 23.98 kg/m .  Physical Exam  Constitutional:       Appearance: Normal appearance. She is not ill-appearing.   HENT:      Head: Normocephalic and atraumatic.   Skin:     General: Skin is warm and dry.      Comments: Right great toenail is dystrophic, thickened, dark yellow to brown in color.  No obvious erythema surrounding the nail fold.  Tenderness on palpation.  No other toenails affected.  Other toenails within normal limits.  No peeling of skin on the feet or pedis.   Neurological:      Mental Status: She is alert and oriented to person, place, and time. "   Psychiatric:         Mood and Affect: Mood normal.         Behavior: Behavior normal.

## 2023-12-28 LAB
ADALIMUMAB AB SERPL IA-MCNC: <1.7 U/ML
ADALIMUMAB SERPL-MCNC: 13.3 UG/ML

## 2023-12-28 RX ORDER — ITRACONAZOLE 100 MG/1
200 CAPSULE ORAL DAILY
Qty: 168 CAPSULE | Refills: 0 | Status: SHIPPED | OUTPATIENT
Start: 2023-12-28 | End: 2024-03-21

## 2024-04-02 ENCOUNTER — OFFICE VISIT (OUTPATIENT)
Dept: FAMILY MEDICINE | Facility: CLINIC | Age: 29
End: 2024-04-02
Payer: COMMERCIAL

## 2024-04-02 VITALS
TEMPERATURE: 98.2 F | RESPIRATION RATE: 16 BRPM | SYSTOLIC BLOOD PRESSURE: 100 MMHG | WEIGHT: 159.8 LBS | DIASTOLIC BLOOD PRESSURE: 68 MMHG | HEIGHT: 68 IN | BODY MASS INDEX: 24.22 KG/M2 | HEART RATE: 67 BPM | OXYGEN SATURATION: 99 %

## 2024-04-02 DIAGNOSIS — R19.7 DIARRHEA OF PRESUMED INFECTIOUS ORIGIN: Primary | ICD-10-CM

## 2024-04-02 DIAGNOSIS — A04.4 E. COLI GASTROENTERITIS: ICD-10-CM

## 2024-04-02 DIAGNOSIS — B35.1 ONYCHOMYCOSIS: ICD-10-CM

## 2024-04-02 PROCEDURE — 99214 OFFICE O/P EST MOD 30 MIN: CPT | Performed by: NURSE PRACTITIONER

## 2024-04-02 PROCEDURE — 87507 IADNA-DNA/RNA PROBE TQ 12-25: CPT | Performed by: NURSE PRACTITIONER

## 2024-04-02 RX ORDER — ITRACONAZOLE 100 MG/1
200 CAPSULE ORAL DAILY
Qty: 168 CAPSULE | Refills: 0 | Status: SHIPPED | OUTPATIENT
Start: 2024-04-02 | End: 2024-06-25

## 2024-04-02 NOTE — PROGRESS NOTES
Assessment & Plan     Diarrhea of presumed infectious origin  Loose stools since travel to [place] 2 weeks ago, 2 loose stools daily with cramping.  Has h/o Crohn's disease and C. difficile.  Considered empiric treatment for traveler's diarrhea, but given c diff history and that symptoms are mildly improving (and she is stable without bloody diarrhea or fever), will proceed with stool culture first.  She can continue a bland diet and pushing fluids.    - Enteric Bacteria and Virus Panel by YASIR Stool; Future  - Enteric Bacteria and Virus Panel by YASIR Stool    Onychomycosis  Prescription refilled for itraconazole.  She was never able to  the prescription.  He was also very expensive.  She could try GoodRx to see if it is more affordable at a different pharmacy.  Happy to send to a different pharmacy if needed.  She tried and failed terbinafine.  She does have an appointment with dermatology in June.  She will need to order for this.  She will let us know what clinic this needs to be sent to.   - itraconazole (SPORANOX) 100 MG capsule; Take 2 capsules (200 mg) by mouth daily for 84 days For 12 weeks      Enterotoxigenic E. Coli EAEC  Given persistent symptoms, recommend treatment with ciprofloxacin 750 mg daily x 3 days.  Recommend probiotic supplement while taking antibiotic to help prevent antibiotic associated diarrhea and C. difficile.        Subjective   Brenda is a 29 year old, presenting for the following health issues:  Gastrointestinal Problem (Diarrhea, cramping and fatigue after trip to [place] x 1.5 weeks)        4/2/2024     4:57 PM   Additional Questions   Roomed by LENARD Sawyer     Brenda is a very pleasant 29 year old who presents today for evaluation of change in bowel habits after trip to [place] 2 weeks ago.  Mid trip she and travelling companions ate something, maybe fruit?, and they all developed symptoms.  She has history of Crohn's disease so somewhat used to diarrhea and abdominal  "pain.  She was not sure if this was a flare of Crohn's or infection.  She is having 3 loose, watery stools daily, has decreased to 2 softer stools to looser stools daily and she continues to have abdominal cramping.  Denies blood in the stool.  No fever or chills.  She does have fatigue.    She has a history of C. difficile.  She does inquire about refill of itraconazole for toenail fungus.  This was prescribed a while back but is very expensive so was not billed.  She also needs a referral to dermatology sent, already has an appointment in June.  She will let us know which clinic to send to.      History of Present Illness       Reason for visit:  Diahhrea    She eats 2-3 servings of fruits and vegetables daily.She consumes 1 sweetened beverage(s) daily.She exercises with enough effort to increase her heart rate 30 to 60 minutes per day.  She exercises with enough effort to increase her heart rate 3 or less days per week.   She is taking medications regularly.                 Review of Systems  Constitutional, HEENT, cardiovascular, pulmonary, gi and gu systems are negative, except as otherwise noted.      Objective    /68 (BP Location: Right arm, Patient Position: Sitting, Cuff Size: Adult Regular)   Pulse 67   Temp 98.2  F (36.8  C) (Tympanic)   Resp 16   Ht 1.734 m (5' 8.25\")   Wt 72.5 kg (159 lb 12.8 oz)   SpO2 99%   BMI 24.12 kg/m    Body mass index is 24.12 kg/m .  Physical Exam  Constitutional:       Appearance: Normal appearance. She is ill-appearing.      Comments: Tired appearing   HENT:      Head: Normocephalic and atraumatic.      Right Ear: Tympanic membrane normal.      Left Ear: Tympanic membrane normal.      Mouth/Throat:      Mouth: Mucous membranes are dry.   Cardiovascular:      Rate and Rhythm: Normal rate and regular rhythm.   Pulmonary:      Effort: Pulmonary effort is normal.      Breath sounds: Normal breath sounds.   Abdominal:      General: Abdomen is flat. Bowel sounds are " normal.      Palpations: Abdomen is soft.      Tenderness: There is abdominal tenderness. There is no guarding or rebound.   Neurological:      Mental Status: She is alert.                    Signed Electronically by: ROSELINE Sherwood CNP

## 2024-04-03 ENCOUNTER — APPOINTMENT (OUTPATIENT)
Dept: LAB | Facility: CLINIC | Age: 29
End: 2024-04-03
Payer: COMMERCIAL

## 2024-04-03 LAB

## 2024-04-04 RX ORDER — CIPROFLOXACIN 750 MG/1
750 TABLET, FILM COATED ORAL DAILY
Qty: 3 TABLET | Refills: 0 | Status: SHIPPED | OUTPATIENT
Start: 2024-04-04 | End: 2024-04-07

## 2024-05-06 ENCOUNTER — PATIENT OUTREACH (OUTPATIENT)
Dept: FAMILY MEDICINE | Facility: CLINIC | Age: 29
End: 2024-05-06
Payer: COMMERCIAL

## 2024-05-06 PROBLEM — R87.810 CERVICAL HIGH RISK HPV (HUMAN PAPILLOMAVIRUS) TEST POSITIVE: Status: ACTIVE | Noted: 2023-06-02

## 2024-05-07 DIAGNOSIS — B35.1 ONYCHOMYCOSIS: ICD-10-CM

## 2024-05-08 RX ORDER — TERBINAFINE HYDROCHLORIDE 250 MG/1
1 TABLET ORAL DAILY
Qty: 90 TABLET | Refills: 0 | Status: SHIPPED | OUTPATIENT
Start: 2024-05-08

## 2024-06-06 NOTE — TELEPHONE ENCOUNTER
Patient due for Pap and HPV.    Reminder done today via telephone calll-spoke to the pt and gave her the phone number to call and schedule.

## 2024-06-28 NOTE — TELEPHONE ENCOUNTER
Maykel Jacob,   Patient is lost to pap tracking follow-up. Attempts to contact pt have been made per reminder process and there has been no reply and/or no appt scheduled.     Pap HX:  Pt takes Humira, potential for immunosupression  12/24/20 ASCUS Pap, +HR HPV type unknown  11/24/21 ASCUS Pap, +HR HPV (not 16 or 18)  05/24/23 NIL Pap, +HR HPV (not 16 or 18) Plan [place] due bef 08/24/23 06/2/23 Left msg and Mychart result note sent. Seen by patient Brenda Vinson on 6/2/2023  9:20 AM  06/14/23 [place] visually normal, no bx's taken Plan [place] due 05/24/24 05/26/24 Reminder Mychart  06/6/24 Reminder call-spoke to the pt and gave her the phone number to call and schedule    06/28/24 Lost to follow-up for pap tracking, abebe routed to provider

## 2024-07-02 ENCOUNTER — OFFICE VISIT (OUTPATIENT)
Dept: FAMILY MEDICINE | Facility: CLINIC | Age: 29
End: 2024-07-02
Payer: COMMERCIAL

## 2024-07-02 VITALS
HEART RATE: 75 BPM | BODY MASS INDEX: 24.28 KG/M2 | SYSTOLIC BLOOD PRESSURE: 100 MMHG | DIASTOLIC BLOOD PRESSURE: 68 MMHG | TEMPERATURE: 97.7 F | WEIGHT: 160.2 LBS | RESPIRATION RATE: 16 BRPM | OXYGEN SATURATION: 99 % | HEIGHT: 68 IN

## 2024-07-02 DIAGNOSIS — T83.32XA INTRAUTERINE CONTRACEPTIVE DEVICE THREADS LOST, INITIAL ENCOUNTER: ICD-10-CM

## 2024-07-02 DIAGNOSIS — K50.919 CROHN'S DISEASE WITH COMPLICATION, UNSPECIFIED GASTROINTESTINAL TRACT LOCATION (H): ICD-10-CM

## 2024-07-02 DIAGNOSIS — D84.9 IMMUNOCOMPROMISED (H): ICD-10-CM

## 2024-07-02 DIAGNOSIS — Z12.4 CERVICAL CANCER SCREENING: Primary | ICD-10-CM

## 2024-07-02 PROCEDURE — 87624 HPV HI-RISK TYP POOLED RSLT: CPT | Performed by: NURSE PRACTITIONER

## 2024-07-02 PROCEDURE — G0145 SCR C/V CYTO,THINLAYER,RESCR: HCPCS | Performed by: NURSE PRACTITIONER

## 2024-07-02 PROCEDURE — 99214 OFFICE O/P EST MOD 30 MIN: CPT | Performed by: NURSE PRACTITIONER

## 2024-07-02 NOTE — PROGRESS NOTES
Assessment & Plan     Cervical cancer screening  Pt takes Humira, potential for immunosupression  12/24/20 ASCUS Pap, +HR HPV type unknown  11/24/21 ASCUS Pap, +HR HPV (not 16 or 18)  05/24/23 NIL Pap, +HR HPV (not 16 or 18) Plan [place] due bef 08/24/23 06/2/23 Left msg and Mychart result note sent. Seen by patient Brenda Vinson on 6/2/2023 9:20 AM  06/14/23 [place] visually normal, no bx's taken Plan [place] due 05/24/24   - Pap Screen with HPV - Recommended Age 30 - 65 Years  - Gynecologic Cytology (PAP)    Due pap with HPV and agreeable to complete today.  If cytology is negative and HPV is negative, recommend repeat cotesting in 3 years.  If Pap is abnormal (greater than ASCUS) or she has positive HPV guidelines recommend colposcopy.    She has no concerning signs or symptoms today except for mild cramping and spotting this week.  She is in a monogamous relationship with her boyfriend, no new partners.    Intrauterine contraceptive device threads lost, initial encounter  On exam, IUD threads not observed.  Decision making, agree to proceed with pelvic ultrasound for further evaluation  - US Pelvic Complete with Transvaginal; Future    Immunocompromised (H24)   On Humira for Crohn's disease, increases risk for immunosuppression.    Crohn's disease with complication, unspecified gastrointestinal tract location (H)   Currently well-controlled on Humira.  Followed by GI.          Edith   Brenda is a 29 year old, presenting for the following health issues:  Gyn Exam (Pap per provider and check IUD placement)        7/2/2024     2:56 PM   Additional Questions   Roomed by LENARD Sawyer     Brenda presents today for Pap smear.  She has IUD in place but strings are not visualized today.  She has had some spotting this week but typically does not spot or cramp.  Has had intermittent cramping and spotting recently.  Symptoms are better today.  Does not get a regular period.  She works Pap with positive HPV, normal looking  "colposcopy, high risk for immunosuppression due to Crohn's disease on Humira.  She is heading to [place] soon on vacation with her boyfriend.    History of Present Illness       Reason for visit:  Pap smear    She eats 0-1 servings of fruits and vegetables daily.She consumes 0 sweetened beverage(s) daily.She exercises with enough effort to increase her heart rate 30 to 60 minutes per day.  She exercises with enough effort to increase her heart rate 5 days per week.   She is taking medications regularly.                 Review of Systems  Constitutional, HEENT, cardiovascular, pulmonary, gi and gu systems are negative, except as otherwise noted.      Objective    /68 (BP Location: Right arm, Patient Position: Sitting, Cuff Size: Adult Regular)   Pulse 75   Temp 97.7  F (36.5  C) (Tympanic)   Resp 16   Ht 1.734 m (5' 8.25\")   Wt 72.7 kg (160 lb 3.2 oz)   SpO2 99%   BMI 24.18 kg/m    Body mass index is 24.18 kg/m .  Physical Exam   GENERAL: alert and no distress  EYES: Eyes grossly normal to inspection, PERRL and conjunctivae and sclerae normal  HENT: ear canals and TM's normal, nose and mouth without ulcers or lesions  NECK: no adenopathy, no asymmetry, masses, or scars  RESP: lungs clear to auscultation - no rales, rhonchi or wheezes  CV: regular rate and rhythm, normal S1 S2, no S3 or S4, no murmur, click or rub, no peripheral edema  ABDOMEN: soft, nontender, no hepatosplenomegaly, no masses and bowel sounds normal   (female): normal female external genitalia, normal urethral meatus, normal vaginal mucosa  MS: no gross musculoskeletal defects noted, no edema  SKIN: no suspicious lesions or rashes  NEURO: Normal strength and tone, mentation intact and speech normal  PSYCH: mentation appears normal, affect normal/bright    Results for orders placed or performed in visit on 07/02/24   Pap Screen with HPV - Recommended Age 30 - 65 Years     Status: None   Result Value Ref Range    Human Papilloma Virus 16 " DNA Negative Negative    Human Papilloma Virus 18 DNA Negative Negative    Human Papilloma Virus Other Negative Negative    FINAL DIAGNOSIS       This patient's sample is negative for high risk HPV DNA.          METHODOLOGY: The [employer] system uses automated extraction, simultaneous amplification of HPV (E6/E7 oncogenes) and beta-globin, followed by real time detection of fluorescent labeled HPV and beta globin using specific oligonucleotide probes. The test specifically identifies types HPV 16 DNA and HPV 18 DNA while concurrently detecting the rest of the high risk types (31, 33, 35, 39, 45, 51, 52, 56, 58, 59, 66 or 68).     COMMENTS: This test is not intended for use as a screening device for woman under age 30 with normal cervical cytology. Results should be correlated with cytologic and histologic findings. Close clinical follow up is recommended.               Signed Electronically by: ROSELINE Sherwood CNP

## 2024-07-03 LAB
HPV HR 12 DNA CVX QL NAA+PROBE: NEGATIVE
HPV16 DNA CVX QL NAA+PROBE: NEGATIVE
HPV18 DNA CVX QL NAA+PROBE: NEGATIVE
HUMAN PAPILLOMA VIRUS FINAL DIAGNOSIS: NORMAL

## 2024-07-08 PROBLEM — D84.9 IMMUNOCOMPROMISED (H): Status: ACTIVE | Noted: 2024-07-08

## 2024-07-09 LAB
BKR LAB AP GYN ADEQUACY: NORMAL
BKR LAB AP GYN INTERPRETATION: NORMAL
BKR LAB AP PREVIOUS ABNL DX: NORMAL
BKR LAB AP PREVIOUS ABNORMAL: NORMAL
PATH REPORT.COMMENTS IMP SPEC: NORMAL
PATH REPORT.COMMENTS IMP SPEC: NORMAL
PATH REPORT.RELEVANT HX SPEC: NORMAL

## 2024-10-05 ENCOUNTER — HEALTH MAINTENANCE LETTER (OUTPATIENT)
Age: 29
End: 2024-10-05

## 2024-10-16 ENCOUNTER — OFFICE VISIT (OUTPATIENT)
Dept: FAMILY MEDICINE | Facility: CLINIC | Age: 29
End: 2024-10-16
Payer: COMMERCIAL

## 2024-10-16 VITALS
BODY MASS INDEX: 22.39 KG/M2 | DIASTOLIC BLOOD PRESSURE: 62 MMHG | OXYGEN SATURATION: 100 % | HEIGHT: 68 IN | HEART RATE: 88 BPM | WEIGHT: 147.7 LBS | RESPIRATION RATE: 16 BRPM | TEMPERATURE: 96.9 F | SYSTOLIC BLOOD PRESSURE: 92 MMHG

## 2024-10-16 DIAGNOSIS — K56.609 SBO (SMALL BOWEL OBSTRUCTION) (H): Primary | ICD-10-CM

## 2024-10-16 PROCEDURE — 99213 OFFICE O/P EST LOW 20 MIN: CPT | Performed by: FAMILY MEDICINE

## 2024-10-16 RX ORDER — ACETAMINOPHEN 325 MG/1
325-650 TABLET ORAL
COMMUNITY

## 2024-10-16 RX ORDER — SCOLOPAMINE TRANSDERMAL SYSTEM 1 MG/1
1 PATCH, EXTENDED RELEASE TRANSDERMAL
COMMUNITY
Start: 2024-09-24

## 2024-10-16 NOTE — PROGRESS NOTES
"  Assessment & Plan     SBO (small bowel obstruction) (H)  Patient is recovering from her laparoscopic ileocecectomy.  There is taken her a while to stabilize.  She reports her stools are becoming more formed.  She will follow-up as needed.        MED REC REQUIRED  Post Medication Reconciliation Status:           Edith Gutierrez is a 29 year old, presenting for the following health issues:  Hospital F/U (Hospital follow up--9/19/24-9/24/24 for SBO; surgery.   Check suture site)      10/16/2024     8:40 AM   Additional Questions   Roomed by Michelle MONTEJO CMA   Accompanied by Self     HPI     Patient is here in follow-up from her recent surgery.  She had laparoscopic ileocecectomy for an anastomotic stricture due to Crohn's disease.  She has some concerns about her incisions.    Hospital Follow-up Visit:    Hospital/Nursing Home/IP Rehab Facility:  [facility]  Date of Admission: 09/19/2024  Date of Discharge: 09/24/2024  Reason(s) for Admission: SBO  Was the patient in the ICU or did the patient experience delirium during hospitalization?  No  Do you have any other stressors you would like to discuss with your provider? No    Problems taking medications regularly:  None  Medication changes since discharge: None  Problems adhering to non-medication therapy:  None    Summary of hospitalization:  CareEverywhere information obtained and reviewed  Diagnostic Tests/Treatments reviewed.  Follow up needed: none  Other Healthcare Providers Involved in Patient s Care:         None  Update since discharge: improved.         Plan of care communicated with patient                       Objective    BP 92/62 (BP Location: Right arm, Patient Position: Sitting, Cuff Size: Adult Regular)   Pulse 88   Temp 96.9  F (36.1  C) (Tympanic)   Resp 16   Ht 1.734 m (5' 8.25\")   Wt 67 kg (147 lb 11.2 oz)   SpO2 100%   BMI 22.29 kg/m    Body mass index is 22.29 kg/m .  Physical Exam   Alert, oriented, no acute distress  Normal " rate  Nonlabored breathing  Abdominal exam reveals healing laparoscopic incisions.  There is no inflammation, mild tenderness present            Signed Electronically by: Zac Correa MD

## 2024-10-19 ENCOUNTER — NURSE TRIAGE (OUTPATIENT)
Dept: NURSING | Facility: CLINIC | Age: 29
End: 2024-10-19
Payer: COMMERCIAL

## 2024-10-19 NOTE — TELEPHONE ENCOUNTER
Nurse Triage SBAR    Is this a 2nd Level Triage? NO    Situation: Pt calling with concerns for redness and itching on abdomen.    Background: Pt had a laparoscopic ileocecectomy on 9/21 with [facility]. Today, she noticed the redness.    Assessment: Redness and itching is located by her 3 smaller incisions on her abdomen. She has an additional incision that is about an inch long. She has been applying aquaphor to her abdomen and wonders if the rash is related to that. One of her smaller incisions did open about 2 days ago. Denies discharge, fever, and bleeding.    Protocol Recommended Disposition:   See PCP Within 24 Hours    Recommendation: Be seen within the next 24 hours or call surgery team on call. Number provided. Discussed taking an antihistamine.     Reason for Disposition   [1] Small red area or streak AND [2] no fever    Additional Information   Negative: [1] Major abdominal surgical incision AND [2] wound gaping open AND [3] visible internal organs   Negative: Sounds like a life-threatening emergency to the triager   Negative: [1] Bleeding from incision AND [2] won't stop after 10 minutes of direct pressure   Negative: [1] Bleeding (more than a few drops) from incision AND [2] tracheostomy or blood vessel surgery (e.g., carotid endarterectomy, femoral bypass graft, kidney dialysis fistula)   Negative: [1] Widespread rash AND [2] bright red, sunburn-like   Negative: Severe pain in the incision   Negative: [1] Incision gaping open AND [2] length of opening > 2 inches (5 cm)   Negative: Patient sounds very sick or weak to the triager   Negative: Sounds like a serious complication to the triager   Negative: [1] Incision gaping open AND [2] < 48 hours since wound re-opened   Negative: Fever > 100.4 F (38.0 C)   Negative: [1] Incision looks infected (spreading redness, pain) AND [2] fever > 99.5 F (37.5 C)   Negative: [1] Incision looks infected (spreading redness, pain) AND [2] face wound   Negative: [1]  Pus or bad-smelling fluid draining from incision AND [2] no fever   Negative: [1] Red streak runs from the incision AND [2] longer than 1 inch (2.5 cm)   Negative: [1] Post-op pain AND [2] not controlled with pain medications   Negative: Dressing soaked with blood or body fluid (e.g., drainage)   Negative: [1] Scant bleeding (e.g., few drops) from incision AND AND [2] tracheostomy or blood vessel surgery (e.g., carotid endarterectomy, femoral bypass graft, kidney dialysis fistula)   Negative: [1] Incision looks infected (spreading redness, pain) AND [2] large red area (> 2 in. or 5 cm)   Negative: [1] Caller has URGENT question AND [2] triager unable to answer question   Negative: [1] Raised bruise and [2] size > 2 inches (5 cm) and expanding   Negative: [1] INCREASING pain in incision AND [2] > 2 days (48 hours) since surgery    Protocols used: Post-Op Incision Symptoms and [id number]    Leticia Das RN  [facility] Nurse Advisor   10/19/2024  8:04 AM

## 2024-11-15 ENCOUNTER — OFFICE VISIT (OUTPATIENT)
Dept: FAMILY MEDICINE | Facility: CLINIC | Age: 29
End: 2024-11-15
Payer: COMMERCIAL

## 2024-11-15 VITALS
OXYGEN SATURATION: 96 % | WEIGHT: 144.2 LBS | DIASTOLIC BLOOD PRESSURE: 62 MMHG | SYSTOLIC BLOOD PRESSURE: 104 MMHG | BODY MASS INDEX: 21.86 KG/M2 | RESPIRATION RATE: 16 BRPM | HEIGHT: 68 IN | TEMPERATURE: 99 F | HEART RATE: 102 BPM

## 2024-11-15 DIAGNOSIS — J18.9 WALKING PNEUMONIA: Primary | ICD-10-CM

## 2024-11-15 PROCEDURE — 99213 OFFICE O/P EST LOW 20 MIN: CPT | Performed by: NURSE PRACTITIONER

## 2024-11-15 RX ORDER — BENZONATATE 100 MG/1
100 CAPSULE ORAL 3 TIMES DAILY PRN
Qty: 21 CAPSULE | Refills: 0 | Status: SHIPPED | OUTPATIENT
Start: 2024-11-15 | End: 2024-11-22

## 2024-11-15 RX ORDER — AZITHROMYCIN 250 MG/1
TABLET, FILM COATED ORAL
Qty: 6 TABLET | Refills: 0 | Status: SHIPPED | OUTPATIENT
Start: 2024-11-15 | End: 2024-11-20

## 2024-11-15 RX ORDER — HYDROCORTISONE 25 MG/G
OINTMENT TOPICAL 2 TIMES DAILY
COMMUNITY
Start: 2024-10-21

## 2024-11-15 ASSESSMENT — ENCOUNTER SYMPTOMS
EYES NEGATIVE: 1
COUGH: 1
SHORTNESS OF BREATH: 0
ARTHRALGIAS: 0
MYALGIAS: 0
CHILLS: 1
GASTROINTESTINAL NEGATIVE: 1
WHEEZING: 0
FATIGUE: 1
FEVER: 0
SINUS PRESSURE: 1

## 2024-11-15 NOTE — PROGRESS NOTES
"  Assessment & Plan     Walking pneumonia  2 and half week history of cough with worsening symptoms, no fever but has been getting chills and night sweats.  She has history of Crohn's disease, not currently taking Humira.  Recent laparoscopic ileocecectomy for SBO.  Recommend treating with azithromycin I recommend probiotic to help prevent diarrhea.  She does endorse possible history of C. difficile.  Cough, recommend Tessalon Perles.  May continue with over-the-counter cough medication as well.  Follow-up if symptoms not improving over the next 3 days or for new or concerning symptoms.  She is a  and has had exposure to mycoplasma pneumonia.  - azithromycin (ZITHROMAX) 250 MG tablet; Take 2 tablets (500 mg) by mouth daily for 1 day, THEN 1 tablet (250 mg) daily for 4 days.  - benzonatate (TESSALON) 100 MG capsule; Take 1 capsule (100 mg) by mouth 3 times daily as needed for cough.                Subjective   Brenda is a 29 year old, presenting for the following health issues:  Cough (Cough x 2.5 weeks, ear congestion)        11/15/2024     2:42 PM   Additional Questions   Roomed by LENARD Sawyer     Brenda is a very pleasant 29-year-old female with history of Crohn's disease who presents today for evaluation of cough and congestion for 2 and half weeks, symptoms worsening.  She is a teacher at [employer].  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  She has tried Mucinex but it was not very helpful.  She is taking a nighttime cold medicine to help with cough and sleep.  No fever but wakes up with her sheets soaked with sweat and chills.  She now feels pressure in her nose and ears.  She feels \"underwater\".  Overall her symptoms are worsening.  Some of her students with pneumo in classroom      History of Present Illness       Reason for visit:  Chest cold  Symptom onset:  1-2 weeks ago   She is taking medications regularly.                 Review of Systems    Review of Systems " "  Constitutional:  Positive for chills and fatigue. Negative for fever.   HENT:  Positive for congestion, ear pain and sinus pressure.    Eyes: Negative.    Respiratory:  Positive for cough. Negative for shortness of breath and wheezing.    Cardiovascular:  Negative for chest pain.   Gastrointestinal: Negative.    Musculoskeletal:  Negative for arthralgias and myalgias.           Objective    /62 (BP Location: Right arm, Patient Position: Sitting, Cuff Size: Adult Regular)   Pulse 102   Temp 99  F (37.2  C) (Tympanic)   Resp 16   Ht 1.734 m (5' 8.25\")   Wt 65.4 kg (144 lb 3.2 oz)   SpO2 96%   BMI 21.77 kg/m    Body mass index is 21.77 kg/m .  Physical Exam  HENT:      Right Ear: Tympanic membrane is injected.      Left Ear: Tympanic membrane is injected.                    Signed Electronically by: ROSELINE Sherwood CNP    "

## 2024-12-12 DIAGNOSIS — F41.1 GAD (GENERALIZED ANXIETY DISORDER): ICD-10-CM

## 2024-12-12 RX ORDER — LORAZEPAM 0.5 MG/1
0.5 TABLET ORAL EVERY 6 HOURS PRN
Qty: 15 TABLET | Refills: 0 | Status: SHIPPED | OUTPATIENT
Start: 2024-12-12

## 2025-01-21 ENCOUNTER — VIRTUAL VISIT (OUTPATIENT)
Dept: FAMILY MEDICINE | Facility: CLINIC | Age: 30
End: 2025-01-21
Payer: COMMERCIAL

## 2025-01-21 DIAGNOSIS — H10.33 ACUTE BACTERIAL CONJUNCTIVITIS OF BOTH EYES: Primary | ICD-10-CM

## 2025-01-21 PROCEDURE — 98006 SYNCH AUDIO-VIDEO EST MOD 30: CPT | Performed by: FAMILY MEDICINE

## 2025-01-21 RX ORDER — OFLOXACIN 3 MG/ML
SOLUTION/ DROPS OPHTHALMIC
Qty: 5 ML | Refills: 0 | Status: SHIPPED | OUTPATIENT
Start: 2025-01-21 | End: 2025-01-28

## 2025-01-21 NOTE — PATIENT INSTRUCTIONS
Pinkeye: Care Instructions  Overview     Pinkeye is redness and swelling of the eye surface and the conjunctiva (the lining of the eyelid and the covering of the white part of the eye). Pinkeye is also called conjunctivitis. Pinkeye is often caused by infection with bacteria or a virus. Dry air, allergies, smoke, and chemicals are other common causes.  Pinkeye often gets better on its own in 7 to 10 days. Antibiotics only help if the pinkeye is caused by bacteria. Pinkeye caused by infection spreads easily. If an allergy or chemical is causing pinkeye, it will not go away unless you can avoid whatever is causing it.  Follow-up care is a key part of your treatment and safety. Be sure to make and go to all appointments, and call your doctor if you are having problems. It's also a good idea to know your test results and keep a list of the medicines you take.  How can you care for yourself at home?  Wash your hands often. Always wash them before and after you treat pinkeye or touch your eyes or face.  Use moist cotton or a clean, wet cloth to remove crust. Wipe from the inside corner of the eye to the outside. Use a clean part of the cloth for each wipe.  Put cold or warm wet cloths on your eye a few times a day if the eye hurts.  Do not wear contact lenses or eye makeup until the pinkeye is gone. Throw away any eye makeup you were using when you got pinkeye. Clean your contacts and storage case. If you wear disposable contacts, use a new pair when your eye has cleared and it is safe to wear contacts again.  If the doctor gave you antibiotic ointment or eyedrops, use them as directed. Use the medicine for as long as instructed, even if your eye starts looking better soon. Keep the bottle tip clean, and do not let it touch the eye area.  To put in eyedrops or ointment:  Tilt your head back, and pull your lower eyelid down with one finger.  Drop or squirt the medicine inside the lower lid.  Close your eye for 30 to 60  "seconds to let the drops or ointment move around.  Do not touch the ointment or dropper tip to your eyelashes or any other surface.  Do not share towels, pillows, or washcloths while you have pinkeye.  When should you call for help?   Call your doctor now or seek immediate medical care if:    You have pain in your eye, not just irritation on the surface.     You have a change in vision or loss of vision.     You have an increase in discharge from the eye.     Your eye has not started to improve or begins to get worse within 48 hours after you start using antibiotics.     Pinkeye lasts longer than 7 days.   Watch closely for changes in your health, and be sure to contact your doctor if you have any problems.  Where can you learn more?  Go to https://www.[employer].net/patiented  Enter Y392 in the search box to learn more about \"Pinkeye: Care Instructions.\"  Current as of: July 31, 2024  Content Version: 14.3    2024 [employer].   Care instructions adapted under license by your healthcare professional. If you have questions about a medical condition or this instruction, always ask your healthcare professional. [employer] disclaims any warranty or liability for your use of this information.     Taking Care of Pinkeye at Home (01:30)  Your health professional recommends that you watch this short online health video.  Learn ways to ease the discomfort of pinkeye and keep the infection from spreading.   Purpose: Apply home treatment for pinkeye.  Goal: Apply home treatment for pinkeye.    Watch: Scan the QR code or visit the link to view video       https://hwi.se/r/[email or web address]  Current as of: July 31, 2024  Content Version: 14.3    2024 [employer].   Care instructions adapted under license by your healthcare professional. If you have questions about a medical condition or this instruction, always ask your healthcare professional. [employer] disclaims any warranty or " liability for your use of this information.

## 2025-01-21 NOTE — PROGRESS NOTES
Brenda is a 30 year old who is being evaluated via a billable video visit.    How would you like to obtain your AVS? MyChart  If the video visit is dropped, the invitation should be resent by: Text to cell phone: [phone]  Will anyone else be joining your video visit? No      Assessment & Plan     (H10.33) Acute bacterial conjunctivitis of both eyes  (primary encounter diagnosis)  Comment: Concern for treatment failure with polymyxin rec trial of ofloxacin. Patient has no signs/symptoms of secondary infection or sinus/ear involvement att his time but aware if she starts to have worsening symptoms to follow up so we can eval in person and for possible systemic treatment. We precautions stressed to pt; but as well that treatment can need 5-7 days prior to deeming failure. Pt reassured. Suspect viral and possible secondary bacterial conjunctivitis. Contact lens wearer. Will trial fluoroquinolone.   Plan: ofloxacin (OCUFLOX) 0.3 % ophthalmic solution              If symptoms worsen or fail to improve     Subjective   Brenda is a 30 year old, presenting for the following health issues:  Follow Up      1/21/2025     9:51 AM   Additional Questions   Roomed by Marilyn VINSON LPN     History of Present Illness       Reason for visit:  Possible pink eye  Symptom onset:  Today  Symptoms include:  Pink goopy red  Symptom intensity:  Moderate  Symptom progression:  Worsening  Had these symptoms before:  Yes  Has tried/received treatment for these symptoms:  Yes  Previous treatment was successful:  Yes  Prior treatment description:  Antibiotic eye drops  What makes it worse:  Contact lense in  What makes it better:  Nothing   She is taking medications regularly.             Objective           Vitals:  No vitals were obtained today due to virtual visit.    Physical Exam   GENERAL: alert and no distress  EYES: Eyes grossly normal to inspection.  No discharge or erythema, or obvious scleral/conjunctival abnormalities.  RESP: No  audible wheeze, cough, or visible cyanosis.    SKIN: Visible skin clear. No significant rash, abnormal pigmentation or lesions.  NEURO: Cranial nerves grossly intact.  Mentation and speech appropriate for age.  PSYCH: Appropriate affect, tone, and pace of words        Video-Visit Details    Type of service:  Video Visit   Originating Location (pt. Location): Home    Distant Location (provider location):  On-site  Platform used for Video Visit: Isabella  Signed Electronically by: April Ma MD

## 2025-01-28 ENCOUNTER — OFFICE VISIT (OUTPATIENT)
Dept: FAMILY MEDICINE | Facility: CLINIC | Age: 30
End: 2025-01-28
Payer: COMMERCIAL

## 2025-01-28 VITALS
OXYGEN SATURATION: 99 % | HEIGHT: 68 IN | RESPIRATION RATE: 16 BRPM | WEIGHT: 152.5 LBS | TEMPERATURE: 97.5 F | SYSTOLIC BLOOD PRESSURE: 93 MMHG | HEART RATE: 92 BPM | BODY MASS INDEX: 23.11 KG/M2 | DIASTOLIC BLOOD PRESSURE: 60 MMHG

## 2025-01-28 DIAGNOSIS — K50.919 CROHN'S DISEASE WITH COMPLICATION, UNSPECIFIED GASTROINTESTINAL TRACT LOCATION (H): ICD-10-CM

## 2025-01-28 DIAGNOSIS — H10.9 CONJUNCTIVITIS OF BOTH EYES, UNSPECIFIED CONJUNCTIVITIS TYPE: Primary | ICD-10-CM

## 2025-01-28 PROCEDURE — 99214 OFFICE O/P EST MOD 30 MIN: CPT | Performed by: FAMILY MEDICINE

## 2025-01-28 RX ORDER — TOBRAMYCIN AND DEXAMETHASONE 3; 1 MG/ML; MG/ML
SUSPENSION/ DROPS OPHTHALMIC
Qty: 10 ML | Refills: 0 | Status: SHIPPED | OUTPATIENT
Start: 2025-01-28

## 2025-01-28 ASSESSMENT — ENCOUNTER SYMPTOMS: EYE PAIN: 1

## 2025-01-28 NOTE — PROGRESS NOTES
Assessment & Plan   Problem List Items Addressed This Visit       Crohn's disease (H)    Conjunctivitis of both eyes, unspecified conjunctivitis type - Primary    Relevant Medications    tobramycin-dexAMETHasone (TOBRADEX) 0.3-0.1 % ophthalmic suspension        For this visit , I reviewed previous records from external sources and ,  previous multiple  lab results.  This included notes from her previous surgeries, management of Crohn's disease and recent management of her conjunctivitis as well as previous lab results.     Please see detailed plan spell in the after visit summary, I basically put her back on IV antibiotics with dexamethasone on a long tapering frequency.  At this time she will keep her Humira unless there are complications.    I recommended a follow-up appointment with her primary care provider within the next month to address health maintenance gaps.    Edith Gutierrez is a 30 year old, presenting for the following health issues:  Eye Problem (pink eye concerns )  The patient complains of recurrent conjunctivitis.  She was initially treated with Polymyxin trimethoprim drops and when that did not work she was switched to ofloxacin drops.  She reports that around 10 days ago she started with redness in the eye as well as secretion first with her left eye and then with her right 1 at some point she had some edema of the area around the left eye as well.  She is currently on Humira for Crohn's disease and she did stop the Humira 1 week when she started with the conjunctivitis.  She does report because of different illnesses she has been inconsistent with the Humira, she does, very active Crohn's disease that required recent surgery and she has noticed flare ups when she skips Humira for more than 2 weeks.  The patient reports that last fall she had a colon resection surgery because of partial obstruction and previous to that she had had another colon resection.  She reports Crohn's disease  "diagnosed in 2013.  She sees a gastroenterologist regularly.      1/28/2025     7:59 AM   Additional Questions   Roomed by LENARD Urbina     Eye Problem     History of Present Illness       Reason for visit:  Possible pink eye  Symptom onset:  Today  Symptoms include:  Pink goopy red  Symptom intensity:  Moderate  Symptom progression:  Worsening  Had these symptoms before:  Yes  Has tried/received treatment for these symptoms:  Yes  Previous treatment was successful:  Yes  Prior treatment description:  Antibiotic eye drops  What makes it worse:  Contact lense in  What makes it better:  Nothing   She is taking medications regularly.                     Objective    BP 93/60   Pulse 92   Temp 97.5  F (36.4  C) (Oral)   Resp 16   Ht 1.734 m (5' 8.25\")   Wt 69.2 kg (152 lb 8 oz)   LMP  (LMP Unknown)   SpO2 99%   BMI 23.02 kg/m    Body mass index is 23.02 kg/m .  Physical Exam   On exam the patient appears in no acute distress.  HEENT shows mild conjunctival erythema, at this point there is no drainage seen there is no edema in the periorbital region appears to be within normal limits.  She wears regular glasses as she has been avoiding her contacts.            Signed Electronically by: Antonio Scruggs MD    "

## 2025-01-28 NOTE — PATIENT INSTRUCTIONS
Thank you for visiting us today.    Below are a summary of my recommendations as discussed during your visit:  Due to recurrent conjunctivitis and you reduce immune status with the Humira we will do a more prolonged antibiotic treatment that also includes a steroid to reduce local hyperreactivity.  I prescribed a dose and tapering as follows:  Apply to both eyes 1 drop q 4 hours x 2 days, 1 drop q 6 hrs x 2 d, 1 drop q8 hrs x 2 d, 1 drop q12 h x 4d, 1 drop every day x 4d  If at any point there is swelling around your eye you will need reevaluation and we will need to consider adding an oral antibiotic.  Continue off your contacts while you are on treatment with this.  At this point I do not recommend that you quit the Humira unless the conjunctivae inflammation/infection  progresses.    Your chart shows you are behind in several important health maintenance services. I recommend you make a follow up appointment with your primary care provider  , I have put a follow up appointment to address this.       Don't hesitate to contact us if you have any questions    Dr Nielsen (Antonio Scruggs MD)

## 2025-06-04 NOTE — PROGRESS NOTES
MADHU SPANGLER MRN: [id number]  2021 : 1995  S: The patient is in today for colposcopic evaluation of ASCUS Pap smear.  She had colposcopy last year without evidence of JL.  She has no other concerns presently.   O: Colposcopy is satisfactory.  The patient has IUD strings from the os and transformation zone is normal.  After observation with application of acetic acid the patient was reassured.  No biopsies were taken.   A: Atypical Pap but no evidence of HPV on exam.   P: Pap smear in 1 year.   Colposcopy  D:  2021  T:  2021                                               Cecil Benton MD/sq   No

## 2025-06-26 ENCOUNTER — OFFICE VISIT (OUTPATIENT)
Dept: FAMILY MEDICINE | Facility: CLINIC | Age: 30
End: 2025-06-26
Payer: COMMERCIAL

## 2025-06-26 VITALS
DIASTOLIC BLOOD PRESSURE: 61 MMHG | HEART RATE: 82 BPM | OXYGEN SATURATION: 100 % | BODY MASS INDEX: 23.25 KG/M2 | TEMPERATURE: 97.6 F | SYSTOLIC BLOOD PRESSURE: 96 MMHG | HEIGHT: 68 IN | WEIGHT: 153.4 LBS | RESPIRATION RATE: 16 BRPM

## 2025-06-26 DIAGNOSIS — Z12.4 CERVICAL CANCER SCREENING: ICD-10-CM

## 2025-06-26 DIAGNOSIS — T83.32XA INTRAUTERINE CONTRACEPTIVE DEVICE THREADS LOST, INITIAL ENCOUNTER: ICD-10-CM

## 2025-06-26 DIAGNOSIS — Z00.00 ENCOUNTER FOR PREVENTATIVE ADULT HEALTH CARE EXAMINATION: Primary | ICD-10-CM

## 2025-06-26 DIAGNOSIS — K50.90 CROHN'S DISEASE WITHOUT COMPLICATION, UNSPECIFIED GASTROINTESTINAL TRACT LOCATION (H): ICD-10-CM

## 2025-06-26 PROCEDURE — G0145 SCR C/V CYTO,THINLAYER,RESCR: HCPCS | Performed by: FAMILY MEDICINE

## 2025-06-26 SDOH — HEALTH STABILITY: PHYSICAL HEALTH: ON AVERAGE, HOW MANY DAYS PER WEEK DO YOU ENGAGE IN MODERATE TO STRENUOUS EXERCISE (LIKE A BRISK WALK)?: 3 DAYS

## 2025-06-26 ASSESSMENT — SOCIAL DETERMINANTS OF HEALTH (SDOH): HOW OFTEN DO YOU GET TOGETHER WITH FRIENDS OR RELATIVES?: THREE TIMES A WEEK

## 2025-06-26 NOTE — PROGRESS NOTES
Preventive Care Visit  [facility]  Antonio Scruggs MD, Family Medicine  Jun 26, 2025      Assessment & Plan   Problem List Items Addressed This Visit       Encounter for preventative adult health care examination - Primary    Relevant Orders    Lipid panel reflex to direct LDL Fasting    Glucose    Intrauterine contraceptive device threads lost, initial encounter    Relevant Orders    US Pelvic Complete with Transvaginal    Ob/Gyn  Referral    Cervical cancer screening    Relevant Orders    HPV and Gynecologic Cytology Panel - Recommended Age 30-65 Years    Gynecologic Cytology (PAP)      The patient was sent to get an ultrasound to verify the position of the IUD, because they could see her at that moment I had to let the patient go before any additional lab work, I did get the call from the technician saying that it was in its normal place, I sent a referral to gynecology to [facility].  I did place an order for lipids and a glucose, she has had numerous BMPs, CMP's in regards to her recent hospitalization for Crohn's disease.         Reviewed preventive health counseling, as reflected in patient instructions       Family planning  Counseling  Appropriate preventive services were addressed with this patient via screening, questionnaire, or discussion as appropriate for fall prevention, nutrition, physical activity, Tobacco-use cessation, social engagement, weight loss and cognition.  Checklist reviewing preventive services available has been given to the patient.  Reviewed patient's diet, addressing concerns and/or questions.   She is at risk for lack of exercise and has been provided with information to increase physical activity for the benefit of her well-being.   She is at risk for psychosocial distress and has been provided with information to reduce risk.           Edith Gutierrez is a 30 year old, presenting for the following:  Physical (Iud removal,  pap)        6/26/2025    10:10 AM   Additional Questions   Roomed by LENARD Urbina          Brenda is here for a routine preventive physical exam.  She also wants to get her IUD removed on account of planning to have children.  She wants to update her health maintenance and get her Pap smear done as well.  She has history of chron's disease. She had a lap ileocecectomy on 9/19/2024, she takes Humira and it had to be interrupted several times due to illnesses.  Reviewed and updated her past medical history, surgical history, family history and social history.  She does not need any medication refills today.  She reported no additional concerns.               Advance Care Planning            6/26/2025   General Health   How would you rate your overall physical health? Good   Feel stress (tense, anxious, or unable to sleep) To some extent   (!) STRESS CONCERN      6/26/2025   Nutrition   Three or more servings of calcium each day? (!) NO   Diet: Gluten-free/reduced   How many servings of fruit and vegetables per day? (!) 0-1   How many sweetened beverages each day? 0-1         6/26/2025   Exercise   Days per week of moderate/strenous exercise 3 days         6/26/2025   Social Factors   Frequency of gathering with friends or relatives Three times a week   Worry food won't last until get money to buy more No   Food not last or not have enough money for food? No   Do you have housing? (Housing is defined as stable permanent housing and does not include staying outside in a car, in a tent, in an abandoned building, in an overnight shelter, or couch-surfing.) Yes   Are you worried about losing your housing? No   Lack of transportation? No   Unable to get utilities (heat,electricity)? No         6/26/2025   Dental   Dentist two times every year? Yes           Today's PHQ-2 Score:       1/17/2025     4:19 PM   PHQ-2 ( 1999 [employer])   Q1: Little interest or pleasure in doing things 0    Q2: Feeling down, depressed or hopeless 0   "  PHQ-2 Score 0    Q1: Little interest or pleasure in doing things Not at all   Q2: Feeling down, depressed or hopeless Not at all   PHQ-2 Score 0       Proxy-reported         6/26/2025   Substance Use   Alcohol more than 3/day or more than 7/wk No   Do you use any other substances recreationally? No     Social History     Tobacco Use    Smoking status: Never     Passive exposure: Never    Smokeless tobacco: Never   Vaping Use    Vaping status: Never Used   Substance Use Topics    Alcohol use: Yes     Comment: occasional    Drug use: Never          Mammogram Screening - Patient under 40 years of age: Routine Mammogram Screening not recommended.           6/26/2025   One time HIV Screening   Previous HIV test? I don't know         6/26/2025   STI Screening   New sexual partner(s) since last STI/HIV test? No     History of abnormal Pap smear: No - age 30- 64 PAP with HPV every 5 years recommended        Latest Ref Rng & Units 7/2/2024     3:27 PM 5/24/2023     8:06 AM   PAP / HPV   PAP  Negative for Intraepithelial Lesion or Malignancy (NILM)  Negative for Intraepithelial Lesion or Malignancy (NILM)    HPV 16 DNA Negative Negative  Negative    HPV 18 DNA Negative Negative  Negative    Other HR HPV Negative Negative  Positive            6/26/2025   Contraception/Family Planning   Questions about contraception or family planning No   What are your periods like? Not currently having periods        Reviewed and updated as needed this visit by Provider   Tobacco     Med Hx  Surg Hx  Fam Hx  Soc Hx Sexual Activity                   Objective    Exam  BP 96/61   Pulse 82   Temp 97.6  F (36.4  C) (Oral)   Resp 16   Ht 1.734 m (5' 8.25\")   Wt 69.6 kg (153 lb 6.4 oz)   SpO2 100%   BMI 23.15 kg/m     Estimated body mass index is 23.15 kg/m  as calculated from the following:    Height as of this encounter: 1.734 m (5' 8.25\").    Weight as of this encounter: 69.6 kg (153 lb 6.4 oz).    Physical Exam  On physical " examination she appears in no acute distress, vital signs were reviewed and within normal limits.  HEENT:  Head: Normocephalic, atraumatic.  Eyes: PERRLA; EOMI; sclerae and conjunctivae clear.  Ears: Tympanic membranes intact, no discharge.  Nose: Mucosa pink, septum midline.  Throat: Oral mucosa pink, no tonsillar enlargement or exudate.  Neck: Supple, no lymphadenopathy or thyroid enlargement.  Heart exam: Heart rate and rhythm are normal and regular. No murmurs, gallop or rubs are auscultated, S1 and S2 are heard at normal intensity.  Lungs: Breath sounds are clear and vesicular throughout all lung fields. No wheezes, crackles, or rhonchi detected.  Abdomen nontender to palpation.  Genital exam within normal limits, in the presence of my medical assistants the cervical os was identified with the speculum without problems, unfortunately I could not see the strings of the IUD, I used a long cotton swab to remove any mucus and the strings were still not visualized, I also tried to reach into the external os of the cervix with Sachin forceps without locating the strings.  Collected is Pap smear and HPV.        Signed Electronically by: Antonio Scruggs MD     actual

## 2025-06-27 ENCOUNTER — RESULTS FOLLOW-UP (OUTPATIENT)
Dept: OBGYN | Facility: CLINIC | Age: 30
End: 2025-06-27

## 2025-07-01 LAB
BKR AP ASSOCIATED HPV REPORT: NORMAL
BKR LAB AP GYN ADEQUACY: NORMAL
BKR LAB AP GYN INTERPRETATION: NORMAL
BKR LAB AP LMP: NORMAL
BKR LAB AP PREVIOUS ABNORMAL: NORMAL
PATH REPORT.COMMENTS IMP SPEC: NORMAL
PATH REPORT.COMMENTS IMP SPEC: NORMAL
PATH REPORT.RELEVANT HX SPEC: NORMAL

## 2025-07-30 DIAGNOSIS — F41.1 GAD (GENERALIZED ANXIETY DISORDER): ICD-10-CM

## 2025-07-30 RX ORDER — LORAZEPAM 0.5 MG/1
0.5 TABLET ORAL EVERY 6 HOURS PRN
Qty: 15 TABLET | Refills: 0 | Status: SHIPPED | OUTPATIENT
Start: 2025-07-30

## 2025-07-30 NOTE — TELEPHONE ENCOUNTER
Medication Question or Refill        What medication are you calling about (include dose and sig)?:   LORazepam (ATIVAN) 0.5 MG tablet [     Preferred Pharmacy:   [id number] - [place], [place] - [address]  [address] [address]  Phone: [phone] Fax: [phone]        Controlled Substance Agreement on file:   CSA -- Patient Level:    CSA: None found at the patient level.       Who prescribed the medication?: Dr. Correa    Do you need a refill? Yes    When did you use the medication last? na    Patient offered an appointment? No    Do you have any questions or concerns?  Yes: Patient out of medication, covering to advise asap.       Could [facility] or would you prefer to receive a phone call?:   Patient would prefer a phone call   Okay to leave a detailed message?: Yes at Home number on file [phone] (home)